# Patient Record
Sex: MALE | Race: WHITE | NOT HISPANIC OR LATINO | Employment: FULL TIME | URBAN - METROPOLITAN AREA
[De-identification: names, ages, dates, MRNs, and addresses within clinical notes are randomized per-mention and may not be internally consistent; named-entity substitution may affect disease eponyms.]

---

## 2017-07-07 ENCOUNTER — HOSPITAL ENCOUNTER (OUTPATIENT)
Dept: RADIOLOGY | Facility: MEDICAL CENTER | Age: 37
End: 2017-07-07
Attending: FAMILY MEDICINE
Payer: COMMERCIAL

## 2017-07-07 DIAGNOSIS — J01.90 ACUTE SINUSITIS, UNSPECIFIED: ICD-10-CM

## 2017-07-07 PROCEDURE — 71020 DX-CHEST-2 VIEWS: CPT

## 2017-07-08 ENCOUNTER — HOSPITAL ENCOUNTER (OUTPATIENT)
Dept: LAB | Facility: MEDICAL CENTER | Age: 37
End: 2017-07-08
Attending: FAMILY MEDICINE
Payer: COMMERCIAL

## 2017-07-08 LAB
25(OH)D3 SERPL-MCNC: 32 NG/ML (ref 30–100)
ALBUMIN SERPL BCP-MCNC: 4.3 G/DL (ref 3.2–4.9)
ALBUMIN/GLOB SERPL: 2 G/DL
ALP SERPL-CCNC: 47 U/L (ref 30–99)
ALT SERPL-CCNC: 170 U/L (ref 2–50)
ANION GAP SERPL CALC-SCNC: 8 MMOL/L (ref 0–11.9)
AST SERPL-CCNC: 94 U/L (ref 12–45)
BASOPHILS # BLD AUTO: 1.4 % (ref 0–1.8)
BASOPHILS # BLD: 0.05 K/UL (ref 0–0.12)
BILIRUB SERPL-MCNC: 1 MG/DL (ref 0.1–1.5)
BUN SERPL-MCNC: 17 MG/DL (ref 8–22)
CALCIUM SERPL-MCNC: 9.4 MG/DL (ref 8.5–10.5)
CHLORIDE SERPL-SCNC: 106 MMOL/L (ref 96–112)
CK SERPL-CCNC: 137 U/L (ref 0–154)
CO2 SERPL-SCNC: 28 MMOL/L (ref 20–33)
CREAT SERPL-MCNC: 1.16 MG/DL (ref 0.5–1.4)
CRP SERPL HS-MCNC: 0.04 MG/DL (ref 0–0.75)
EOSINOPHIL # BLD AUTO: 0.07 K/UL (ref 0–0.51)
EOSINOPHIL NFR BLD: 1.9 % (ref 0–6.9)
ERYTHROCYTE [DISTWIDTH] IN BLOOD BY AUTOMATED COUNT: 38.7 FL (ref 35.9–50)
ERYTHROCYTE [SEDIMENTATION RATE] IN BLOOD BY WESTERGREN METHOD: 6 MM/HOUR (ref 0–15)
GFR SERPL CREATININE-BSD FRML MDRD: >60 ML/MIN/1.73 M 2
GLOBULIN SER CALC-MCNC: 2.2 G/DL (ref 1.9–3.5)
GLUCOSE SERPL-MCNC: 82 MG/DL (ref 65–99)
HCT VFR BLD AUTO: 40.1 % (ref 42–52)
HGB BLD-MCNC: 14.2 G/DL (ref 14–18)
IMM GRANULOCYTES # BLD AUTO: 0.01 K/UL (ref 0–0.11)
IMM GRANULOCYTES NFR BLD AUTO: 0.3 % (ref 0–0.9)
IRON SATN MFR SERPL: 33 % (ref 15–55)
IRON SERPL-MCNC: 103 UG/DL (ref 50–180)
LYMPHOCYTES # BLD AUTO: 1.64 K/UL (ref 1–4.8)
LYMPHOCYTES NFR BLD: 44.9 % (ref 22–41)
MCH RBC QN AUTO: 30.8 PG (ref 27–33)
MCHC RBC AUTO-ENTMCNC: 35.4 G/DL (ref 33.7–35.3)
MCV RBC AUTO: 87 FL (ref 81.4–97.8)
MONOCYTES # BLD AUTO: 0.3 K/UL (ref 0–0.85)
MONOCYTES NFR BLD AUTO: 8.2 % (ref 0–13.4)
NEUTROPHILS # BLD AUTO: 1.58 K/UL (ref 1.82–7.42)
NEUTROPHILS NFR BLD: 43.3 % (ref 44–72)
NRBC # BLD AUTO: 0 K/UL
NRBC BLD AUTO-RTO: 0 /100 WBC
PLATELET # BLD AUTO: 160 K/UL (ref 164–446)
PMV BLD AUTO: 10.9 FL (ref 9–12.9)
POTASSIUM SERPL-SCNC: 4.4 MMOL/L (ref 3.6–5.5)
PROT SERPL-MCNC: 6.5 G/DL (ref 6–8.2)
RBC # BLD AUTO: 4.61 M/UL (ref 4.7–6.1)
RHEUMATOID FACT SER IA-ACNC: <10 IU/ML (ref 0–14)
SODIUM SERPL-SCNC: 142 MMOL/L (ref 135–145)
TIBC SERPL-MCNC: 311 UG/DL (ref 250–450)
VIT B12 SERPL-MCNC: 562 PG/ML (ref 211–911)
WBC # BLD AUTO: 3.7 K/UL (ref 4.8–10.8)

## 2017-07-08 PROCEDURE — 86618 LYME DISEASE ANTIBODY: CPT

## 2017-07-08 PROCEDURE — 86200 CCP ANTIBODY: CPT

## 2017-07-08 PROCEDURE — 82550 ASSAY OF CK (CPK): CPT

## 2017-07-08 PROCEDURE — 86664 EPSTEIN-BARR NUCLEAR ANTIGEN: CPT

## 2017-07-08 PROCEDURE — 86038 ANTINUCLEAR ANTIBODIES: CPT

## 2017-07-08 PROCEDURE — 82607 VITAMIN B-12: CPT

## 2017-07-08 PROCEDURE — 86663 EPSTEIN-BARR ANTIBODY: CPT

## 2017-07-08 PROCEDURE — 86812 HLA TYPING A B OR C: CPT

## 2017-07-08 PROCEDURE — 84165 PROTEIN E-PHORESIS SERUM: CPT

## 2017-07-08 PROCEDURE — 82306 VITAMIN D 25 HYDROXY: CPT

## 2017-07-08 PROCEDURE — 83550 IRON BINDING TEST: CPT

## 2017-07-08 PROCEDURE — 82784 ASSAY IGA/IGD/IGG/IGM EACH: CPT | Mod: 91

## 2017-07-08 PROCEDURE — 85652 RBC SED RATE AUTOMATED: CPT

## 2017-07-08 PROCEDURE — 83540 ASSAY OF IRON: CPT

## 2017-07-08 PROCEDURE — 86665 EPSTEIN-BARR CAPSID VCA: CPT

## 2017-07-08 PROCEDURE — 80053 COMPREHEN METABOLIC PANEL: CPT

## 2017-07-08 PROCEDURE — 86431 RHEUMATOID FACTOR QUANT: CPT

## 2017-07-08 PROCEDURE — 86334 IMMUNOFIX E-PHORESIS SERUM: CPT

## 2017-07-08 PROCEDURE — 36415 COLL VENOUS BLD VENIPUNCTURE: CPT

## 2017-07-08 PROCEDURE — 84160 ASSAY OF PROTEIN ANY SOURCE: CPT

## 2017-07-08 PROCEDURE — 82784 ASSAY IGA/IGD/IGG/IGM EACH: CPT

## 2017-07-08 PROCEDURE — 85025 COMPLETE CBC W/AUTO DIFF WBC: CPT

## 2017-07-08 PROCEDURE — 86140 C-REACTIVE PROTEIN: CPT

## 2017-07-10 LAB
B BURGDOR AB SER IA-ACNC: 0.17 LIV (ref 0–1.2)
CCP IGG SERPL-ACNC: 5 UNITS (ref 0–19)
HLA-B27 QL FC: NORMAL
NUCLEAR IGG SER QL IA: NORMAL

## 2017-07-11 LAB
EBV EA-D IGG SER-ACNC: <5 U/ML (ref 0–10.9)
EBV NA IGG SER IA-ACNC: 120 U/ML (ref 0–21.9)
EBV VCA IGG SER IA-ACNC: 166 U/ML (ref 0–21.9)
EBV VCA IGM SER IA-ACNC: <10 U/ML (ref 0–43.9)

## 2017-07-12 LAB
ALBUMIN SERPL-MCNC: 4.66 G/DL (ref 3.75–5.01)
ALPHA1 GLOB SERPL ELPH-MCNC: 0.22 G/DL (ref 0.19–0.46)
ALPHA2 GLOB SERPL ELPH-MCNC: 0.51 G/DL (ref 0.48–1.05)
B-GLOBULIN SERPL ELPH-MCNC: 0.61 G/DL (ref 0.48–1.1)
GAMMA GLOB SERPL ELPH-MCNC: 0.8 G/DL (ref 0.62–1.51)
IGA SERPL-MCNC: 89 MG/DL (ref 68–408)
IGG SERPL-MCNC: 915 MG/DL (ref 768–1632)
IGM SERPL-MCNC: 50 MG/DL (ref 35–263)
INTERPRETATION SERPL IFE-IMP: NORMAL
INTERPRETATION SERPL IFE-IMP: NORMAL
PATHOLOGY STUDY: NORMAL
PROT SERPL-MCNC: 6.8 G/DL (ref 6–8.3)

## 2017-07-14 ENCOUNTER — HOSPITAL ENCOUNTER (OUTPATIENT)
Dept: RADIOLOGY | Facility: MEDICAL CENTER | Age: 37
End: 2017-07-14
Attending: FAMILY MEDICINE
Payer: COMMERCIAL

## 2017-07-14 DIAGNOSIS — J01.90 ACUTE SINUSITIS, UNSPECIFIED: ICD-10-CM

## 2017-07-14 PROCEDURE — 70486 CT MAXILLOFACIAL W/O DYE: CPT

## 2017-07-19 ENCOUNTER — HOSPITAL ENCOUNTER (OUTPATIENT)
Facility: MEDICAL CENTER | Age: 37
End: 2017-07-19
Attending: OPHTHALMOLOGY
Payer: COMMERCIAL

## 2017-07-19 LAB
ALBUMIN SERPL BCP-MCNC: 4.4 G/DL (ref 3.2–4.9)
ALBUMIN/GLOB SERPL: 2 G/DL
ALP SERPL-CCNC: 41 U/L (ref 30–99)
ALT SERPL-CCNC: 166 U/L (ref 2–50)
AMYLASE SERPL-CCNC: 30 U/L (ref 20–103)
ANION GAP SERPL CALC-SCNC: 7 MMOL/L (ref 0–11.9)
AST SERPL-CCNC: 91 U/L (ref 12–45)
BASOPHILS # BLD AUTO: 1.5 % (ref 0–1.8)
BASOPHILS # BLD: 0.06 K/UL (ref 0–0.12)
BILIRUB CONJ SERPL-MCNC: 0.2 MG/DL (ref 0.1–0.5)
BILIRUB INDIRECT SERPL-MCNC: 1.3 MG/DL (ref 0–1)
BILIRUB SERPL-MCNC: 1.5 MG/DL (ref 0.1–1.5)
BUN SERPL-MCNC: 14 MG/DL (ref 8–22)
CALCIUM SERPL-MCNC: 9.9 MG/DL (ref 8.5–10.5)
CHLORIDE SERPL-SCNC: 104 MMOL/L (ref 96–112)
CO2 SERPL-SCNC: 28 MMOL/L (ref 20–33)
CREAT SERPL-MCNC: 1.16 MG/DL (ref 0.5–1.4)
EOSINOPHIL # BLD AUTO: 0.1 K/UL (ref 0–0.51)
EOSINOPHIL NFR BLD: 2.6 % (ref 0–6.9)
ERYTHROCYTE [DISTWIDTH] IN BLOOD BY AUTOMATED COUNT: 38.7 FL (ref 35.9–50)
GFR SERPL CREATININE-BSD FRML MDRD: >60 ML/MIN/1.73 M 2
GLOBULIN SER CALC-MCNC: 2.2 G/DL (ref 1.9–3.5)
GLUCOSE SERPL-MCNC: 90 MG/DL (ref 65–99)
HAV IGM SERPL QL IA: NEGATIVE
HBV CORE IGM SER QL: NEGATIVE
HBV SURFACE AG SER QL: NEGATIVE
HCT VFR BLD AUTO: 39.5 % (ref 42–52)
HCV AB SER QL: NEGATIVE
HGB BLD-MCNC: 13.6 G/DL (ref 14–18)
IMM GRANULOCYTES # BLD AUTO: 0.01 K/UL (ref 0–0.11)
IMM GRANULOCYTES NFR BLD AUTO: 0.3 % (ref 0–0.9)
LIPASE SERPL-CCNC: 15 U/L (ref 11–82)
LYMPHOCYTES # BLD AUTO: 1.76 K/UL (ref 1–4.8)
LYMPHOCYTES NFR BLD: 44.9 % (ref 22–41)
MCH RBC QN AUTO: 30.2 PG (ref 27–33)
MCHC RBC AUTO-ENTMCNC: 34.4 G/DL (ref 33.7–35.3)
MCV RBC AUTO: 87.8 FL (ref 81.4–97.8)
MONOCYTES # BLD AUTO: 0.23 K/UL (ref 0–0.85)
MONOCYTES NFR BLD AUTO: 5.9 % (ref 0–13.4)
NEUTROPHILS # BLD AUTO: 1.76 K/UL (ref 1.82–7.42)
NEUTROPHILS NFR BLD: 44.8 % (ref 44–72)
NRBC # BLD AUTO: 0 K/UL
NRBC BLD AUTO-RTO: 0 /100 WBC
PLATELET # BLD AUTO: 175 K/UL (ref 164–446)
PMV BLD AUTO: 10.5 FL (ref 9–12.9)
POTASSIUM SERPL-SCNC: 3.9 MMOL/L (ref 3.6–5.5)
PROT SERPL-MCNC: 6.6 G/DL (ref 6–8.2)
RBC # BLD AUTO: 4.5 M/UL (ref 4.7–6.1)
SODIUM SERPL-SCNC: 139 MMOL/L (ref 135–145)
WBC # BLD AUTO: 3.9 K/UL (ref 4.8–10.8)

## 2017-07-19 PROCEDURE — 83690 ASSAY OF LIPASE: CPT

## 2017-07-19 PROCEDURE — 82248 BILIRUBIN DIRECT: CPT

## 2017-07-19 PROCEDURE — 82150 ASSAY OF AMYLASE: CPT

## 2017-07-19 PROCEDURE — 85025 COMPLETE CBC W/AUTO DIFF WBC: CPT

## 2017-07-19 PROCEDURE — 80074 ACUTE HEPATITIS PANEL: CPT

## 2017-07-19 PROCEDURE — 80053 COMPREHEN METABOLIC PANEL: CPT

## 2017-07-29 ENCOUNTER — HOSPITAL ENCOUNTER (OUTPATIENT)
Dept: RADIOLOGY | Facility: MEDICAL CENTER | Age: 37
End: 2017-07-29
Attending: FAMILY MEDICINE
Payer: COMMERCIAL

## 2017-07-29 DIAGNOSIS — R74.8 OTHER NONSPECIFIC ABNORMAL SERUM ENZYME LEVELS: ICD-10-CM

## 2017-07-29 PROCEDURE — 71260 CT THORAX DX C+: CPT

## 2017-07-29 PROCEDURE — 700117 HCHG RX CONTRAST REV CODE 255: Performed by: FAMILY MEDICINE

## 2017-07-29 RX ADMIN — IOHEXOL 100 ML: 350 INJECTION, SOLUTION INTRAVENOUS at 09:14

## 2017-08-05 ENCOUNTER — HOSPITAL ENCOUNTER (OUTPATIENT)
Dept: LAB | Facility: MEDICAL CENTER | Age: 37
End: 2017-08-05
Attending: FAMILY MEDICINE
Payer: COMMERCIAL

## 2017-08-05 LAB
ALBUMIN SERPL BCP-MCNC: 4 G/DL (ref 3.2–4.9)
ALP SERPL-CCNC: 40 U/L (ref 30–99)
ALT SERPL-CCNC: 165 U/L (ref 2–50)
AST SERPL-CCNC: 91 U/L (ref 12–45)
BILIRUB CONJ SERPL-MCNC: 0.2 MG/DL (ref 0.1–0.5)
BILIRUB INDIRECT SERPL-MCNC: 1 MG/DL (ref 0–1)
BILIRUB SERPL-MCNC: 1.2 MG/DL (ref 0.1–1.5)
ERYTHROCYTE [DISTWIDTH] IN BLOOD BY AUTOMATED COUNT: 42.9 FL (ref 35.9–50)
HCT VFR BLD AUTO: 36.1 % (ref 42–52)
HGB BLD-MCNC: 12.8 G/DL (ref 14–18)
HGB RETIC QN AUTO: 36.8 PG/CELL (ref 29–35)
IMM RETICS NFR: 8.9 % (ref 9.3–17.4)
MCH RBC QN AUTO: 31.2 PG (ref 27–33)
MCHC RBC AUTO-ENTMCNC: 35.5 G/DL (ref 33.7–35.3)
MCV RBC AUTO: 88 FL (ref 81.4–97.8)
PLATELET # BLD AUTO: 157 K/UL (ref 164–446)
PMV BLD AUTO: 10.8 FL (ref 9–12.9)
PROT SERPL-MCNC: 6 G/DL (ref 6–8.2)
RBC # BLD AUTO: 4.1 M/UL (ref 4.7–6.1)
RETICS # AUTO: 0.09 M/UL (ref 0.04–0.06)
RETICS/RBC NFR: 2.1 % (ref 0.8–2.1)
WBC # BLD AUTO: 3.5 K/UL (ref 4.8–10.8)

## 2017-08-05 PROCEDURE — 85046 RETICYTE/HGB CONCENTRATE: CPT

## 2017-08-05 PROCEDURE — 85027 COMPLETE CBC AUTOMATED: CPT

## 2017-08-05 PROCEDURE — 36415 COLL VENOUS BLD VENIPUNCTURE: CPT

## 2017-08-05 PROCEDURE — 80076 HEPATIC FUNCTION PANEL: CPT

## 2017-08-17 ENCOUNTER — HOSPITAL ENCOUNTER (OUTPATIENT)
Facility: MEDICAL CENTER | Age: 37
End: 2017-08-17
Attending: INTERNAL MEDICINE
Payer: COMMERCIAL

## 2017-08-17 ENCOUNTER — HOSPITAL ENCOUNTER (OUTPATIENT)
Dept: LAB | Facility: MEDICAL CENTER | Age: 37
End: 2017-08-17
Attending: INTERNAL MEDICINE
Payer: COMMERCIAL

## 2017-08-17 LAB
GFR SERPL CREATININE-BSD FRML MDRD: >60 ML/MIN/1.73 M 2
RHEUMATOID FACT SER IA-ACNC: <10 IU/ML (ref 0–14)

## 2017-08-17 PROCEDURE — 86157 COLD AGGLUTININ TITER: CPT

## 2017-08-17 PROCEDURE — 82550 ASSAY OF CK (CPK): CPT

## 2017-08-17 PROCEDURE — 86200 CCP ANTIBODY: CPT

## 2017-08-17 PROCEDURE — 86235 NUCLEAR ANTIGEN ANTIBODY: CPT | Mod: 91

## 2017-08-17 PROCEDURE — 86431 RHEUMATOID FACTOR QUANT: CPT

## 2017-08-17 PROCEDURE — 82784 ASSAY IGA/IGD/IGG/IGM EACH: CPT

## 2017-08-17 PROCEDURE — 82248 BILIRUBIN DIRECT: CPT

## 2017-08-17 PROCEDURE — 86038 ANTINUCLEAR ANTIBODIES: CPT | Mod: 91

## 2017-08-17 PROCEDURE — 83010 ASSAY OF HAPTOGLOBIN QUANT: CPT

## 2017-08-17 PROCEDURE — 80053 COMPREHEN METABOLIC PANEL: CPT

## 2017-08-17 PROCEDURE — 86225 DNA ANTIBODY NATIVE: CPT

## 2017-08-17 PROCEDURE — 83615 LACTATE (LD) (LDH) ENZYME: CPT

## 2017-08-17 PROCEDURE — 82784 ASSAY IGA/IGD/IGG/IGM EACH: CPT | Mod: 91

## 2017-08-17 PROCEDURE — 81003 URINALYSIS AUTO W/O SCOPE: CPT

## 2017-08-17 PROCEDURE — 86255 FLUORESCENT ANTIBODY SCREEN: CPT | Mod: 91

## 2017-08-17 PROCEDURE — 86235 NUCLEAR ANTIGEN ANTIBODY: CPT

## 2017-08-17 PROCEDURE — 36415 COLL VENOUS BLD VENIPUNCTURE: CPT

## 2017-08-17 PROCEDURE — 86038 ANTINUCLEAR ANTIBODIES: CPT

## 2017-08-17 PROCEDURE — 86880 COOMBS TEST DIRECT: CPT

## 2017-08-18 LAB
ALBUMIN SERPL BCP-MCNC: 4.1 G/DL (ref 3.2–4.9)
ALBUMIN/GLOB SERPL: 2.1 G/DL
ALP SERPL-CCNC: 50 U/L (ref 30–99)
ALT SERPL-CCNC: 238 U/L (ref 2–50)
ANION GAP SERPL CALC-SCNC: 7 MMOL/L (ref 0–11.9)
APPEARANCE UR: CLEAR
AST SERPL-CCNC: 121 U/L (ref 12–45)
BILIRUB CONJ SERPL-MCNC: 0.2 MG/DL (ref 0.1–0.5)
BILIRUB INDIRECT SERPL-MCNC: 1.4 MG/DL (ref 0–1)
BILIRUB SERPL-MCNC: 1.6 MG/DL (ref 0.1–1.5)
BILIRUB UR QL STRIP.AUTO: NEGATIVE
BUN SERPL-MCNC: 15 MG/DL (ref 8–22)
CALCIUM SERPL-MCNC: 9.5 MG/DL (ref 8.5–10.5)
CHLORIDE SERPL-SCNC: 106 MMOL/L (ref 96–112)
CK SERPL-CCNC: 146 U/L (ref 0–154)
CO2 SERPL-SCNC: 28 MMOL/L (ref 20–33)
COLOR UR: YELLOW
CREAT SERPL-MCNC: 1.04 MG/DL (ref 0.5–1.4)
CULTURE IF INDICATED INDCX: NO UA CULTURE
DAT C3D-SP REAG RBC QL: NORMAL
DAT IGG-SP REAG RBC QL: NORMAL
GLOBULIN SER CALC-MCNC: 2 G/DL (ref 1.9–3.5)
GLUCOSE SERPL-MCNC: 73 MG/DL (ref 65–99)
GLUCOSE UR STRIP.AUTO-MCNC: NEGATIVE MG/DL
KETONES UR STRIP.AUTO-MCNC: NEGATIVE MG/DL
LDH SERPL-CCNC: 237 U/L (ref 107–266)
LEUKOCYTE ESTERASE UR QL STRIP.AUTO: NEGATIVE
MICRO URNS: NORMAL
NITRITE UR QL STRIP.AUTO: NEGATIVE
PH UR STRIP.AUTO: 6 [PH]
POTASSIUM SERPL-SCNC: 4.2 MMOL/L (ref 3.6–5.5)
PROT SERPL-MCNC: 6.1 G/DL (ref 6–8.2)
PROT UR QL STRIP: NEGATIVE MG/DL
RBC UR QL AUTO: NEGATIVE
SODIUM SERPL-SCNC: 141 MMOL/L (ref 135–145)
SP GR UR STRIP.AUTO: 1.01
UROBILINOGEN UR STRIP.AUTO-MCNC: NORMAL MG/DL

## 2017-08-19 ENCOUNTER — HOSPITAL ENCOUNTER (OUTPATIENT)
Dept: LAB | Facility: MEDICAL CENTER | Age: 37
End: 2017-08-19
Attending: INTERNAL MEDICINE
Payer: COMMERCIAL

## 2017-08-19 LAB
ANCA IGG TITR SER IF: NORMAL {TITER}
CCP IGG SERPL-ACNC: 5 UNITS (ref 0–19)
FERRITIN SERPL-MCNC: 300.9 NG/ML (ref 22–322)
GGT SERPL-CCNC: 20 U/L (ref 7–51)
HAPTOGLOB SERPL-MCNC: <10 MG/DL (ref 30–200)
IGA SERPL-MCNC: 74 MG/DL (ref 68–408)
IGG SERPL-MCNC: 785 MG/DL (ref 768–1632)
IGM SERPL-MCNC: 45 MG/DL (ref 35–263)
MITOCHONDRIA M2 IGG SER-ACNC: 20.2 UNITS (ref 0–20)
NUCLEAR IGG SER QL IA: NORMAL
NUCLEAR IGG SER QL IA: NORMAL

## 2017-08-19 PROCEDURE — 87522 HEPATITIS C REVRS TRNSCRPJ: CPT

## 2017-08-19 PROCEDURE — 86789 WEST NILE VIRUS ANTIBODY: CPT

## 2017-08-19 PROCEDURE — 86694 HERPES SIMPLEX NES ANTBDY: CPT

## 2017-08-19 PROCEDURE — 86654 ENCEPHALTIS WEST EQNE ANTBDY: CPT | Mod: 91

## 2017-08-19 PROCEDURE — 82977 ASSAY OF GGT: CPT

## 2017-08-19 PROCEDURE — 86617 LYME DISEASE ANTIBODY: CPT

## 2017-08-19 PROCEDURE — 86790 VIRUS ANTIBODY NOS: CPT | Mod: 91

## 2017-08-19 PROCEDURE — 86653 ENCEPHALTIS ST LOUIS ANTBODY: CPT | Mod: 91

## 2017-08-19 PROCEDURE — 82728 ASSAY OF FERRITIN: CPT

## 2017-08-19 PROCEDURE — 86788 WEST NILE VIRUS AB IGM: CPT

## 2017-08-19 PROCEDURE — 86747 PARVOVIRUS ANTIBODY: CPT

## 2017-08-19 PROCEDURE — 82784 ASSAY IGA/IGD/IGG/IGM EACH: CPT

## 2017-08-19 PROCEDURE — 86652 ENCEPHALTIS EAST EQNE ANBDY: CPT

## 2017-08-19 PROCEDURE — 86376 MICROSOMAL ANTIBODY EACH: CPT

## 2017-08-19 PROCEDURE — 82390 ASSAY OF CERULOPLASMIN: CPT

## 2017-08-19 PROCEDURE — 83516 IMMUNOASSAY NONANTIBODY: CPT

## 2017-08-19 PROCEDURE — 82103 ALPHA-1-ANTITRYPSIN TOTAL: CPT

## 2017-08-19 PROCEDURE — 86645 CMV ANTIBODY IGM: CPT

## 2017-08-19 PROCEDURE — 86651 ENCEPHALITIS CALIFORN ANTBDY: CPT | Mod: 91

## 2017-08-19 PROCEDURE — 36415 COLL VENOUS BLD VENIPUNCTURE: CPT

## 2017-08-20 LAB — ENA SM IGG SER-ACNC: 0 AU/ML (ref 0–40)

## 2017-08-21 LAB
A1AT SERPL-MCNC: 106 MG/DL (ref 90–200)
B BURGDOR IGG SER QL IB: NEGATIVE
B19V IGG SER IA-ACNC: 6.99 IV
B19V IGM SER IA-ACNC: 0.24 IV
CMV IGM SERPL IA-ACNC: <8 AU/ML
HSV1+2 IGM SER IA-ACNC: 0.25 IV
IGA SERPL-MCNC: 73 MG/DL (ref 68–408)
LKM AB TITR SER IF: NORMAL {TITER}
TTG IGA SER IA-ACNC: 0 U/ML (ref 0–3)

## 2017-08-22 LAB
CA TITR SERPL AGGL: NORMAL {TITER}
CERULOPLASMIN SERPL-MCNC: 17 MG/DL (ref 17–54)
HCV RNA SERPL NAA+PROBE-ACNC: <15 IU/ML
HCV RNA SERPL NAA+PROBE-LOG IU: <1.2 LOG IU
HCV RNA SERPL QL NAA+PROBE: NOT DETECTED
HEV IGM SER QL: NEGATIVE
PATHOLOGY STUDY: NORMAL
SMA IGG SER-ACNC: 46 UNITS (ref 0–19)
SMOOTH MUSCLE IGG TITR SER: ABNORMAL {TITER}

## 2017-08-23 LAB
EEEV IGG TITR SER IF: NORMAL {TITER}
EEEV IGM TITR SER IF: NORMAL {TITER}
LACV IGG TITR SER IF: NORMAL {TITER}
LACV IGM TITR SER IF: NORMAL {TITER}
SLEV IGG TITR SER IF: NORMAL {TITER}
SLEV IGM TITR SER IF: NORMAL {TITER}
WEEV IGG TITR SER IF: NORMAL {TITER}
WEEV IGM TITR SER IF: NORMAL {TITER}
WNV IGG SER IA-ACNC: 0.06 IV
WNV IGM SER IA-ACNC: 0 IV

## 2017-08-30 ENCOUNTER — HOSPITAL ENCOUNTER (OUTPATIENT)
Dept: LAB | Facility: MEDICAL CENTER | Age: 37
End: 2017-08-30
Attending: INTERNAL MEDICINE
Payer: COMMERCIAL

## 2017-08-30 LAB
ALBUMIN SERPL BCP-MCNC: 3.9 G/DL (ref 3.2–4.9)
ALP SERPL-CCNC: 50 U/L (ref 30–99)
ALT SERPL-CCNC: 89 U/L (ref 2–50)
AST SERPL-CCNC: 57 U/L (ref 12–45)
BILIRUB CONJ SERPL-MCNC: 0.3 MG/DL (ref 0.1–0.5)
BILIRUB INDIRECT SERPL-MCNC: 1.5 MG/DL (ref 0–1)
BILIRUB SERPL-MCNC: 1.8 MG/DL (ref 0.1–1.5)
ERYTHROCYTE [DISTWIDTH] IN BLOOD BY AUTOMATED COUNT: 39.8 FL (ref 35.9–50)
FASTING STATUS PATIENT QL REPORTED: NORMAL
HCT VFR BLD AUTO: 36.8 % (ref 42–52)
HGB BLD-MCNC: 13.2 G/DL (ref 14–18)
INR PPP: 0.98 (ref 0.87–1.13)
MCH RBC QN AUTO: 31.3 PG (ref 27–33)
MCHC RBC AUTO-ENTMCNC: 35.9 G/DL (ref 33.7–35.3)
MCV RBC AUTO: 87.2 FL (ref 81.4–97.8)
PLATELET # BLD AUTO: 163 K/UL (ref 164–446)
PMV BLD AUTO: 10.8 FL (ref 9–12.9)
PROT SERPL-MCNC: 6.3 G/DL (ref 6–8.2)
PROTHROMBIN TIME: 13.3 SEC (ref 12–14.6)
RBC # BLD AUTO: 4.22 M/UL (ref 4.7–6.1)
TSH SERPL DL<=0.005 MIU/L-ACNC: 1.03 UIU/ML (ref 0.3–3.7)
WBC # BLD AUTO: 3 K/UL (ref 4.8–10.8)

## 2017-08-30 PROCEDURE — 84443 ASSAY THYROID STIM HORMONE: CPT

## 2017-08-30 PROCEDURE — 85610 PROTHROMBIN TIME: CPT

## 2017-08-30 PROCEDURE — 80076 HEPATIC FUNCTION PANEL: CPT

## 2017-08-30 PROCEDURE — 85027 COMPLETE CBC AUTOMATED: CPT

## 2017-08-30 PROCEDURE — 36415 COLL VENOUS BLD VENIPUNCTURE: CPT

## 2017-09-12 ENCOUNTER — HOSPITAL ENCOUNTER (EMERGENCY)
Facility: MEDICAL CENTER | Age: 37
End: 2017-09-13
Attending: EMERGENCY MEDICINE
Payer: COMMERCIAL

## 2017-09-12 ENCOUNTER — HOSPITAL ENCOUNTER (EMERGENCY)
Facility: MEDICAL CENTER | Age: 37
End: 2017-09-12
Payer: COMMERCIAL

## 2017-09-12 ENCOUNTER — APPOINTMENT (OUTPATIENT)
Dept: RADIOLOGY | Facility: MEDICAL CENTER | Age: 37
End: 2017-09-12
Attending: EMERGENCY MEDICINE
Payer: COMMERCIAL

## 2017-09-12 ENCOUNTER — HOSPITAL ENCOUNTER (OUTPATIENT)
Facility: MEDICAL CENTER | Age: 37
End: 2017-09-12
Attending: PSYCHIATRY & NEUROLOGY
Payer: COMMERCIAL

## 2017-09-12 ENCOUNTER — HOSPITAL ENCOUNTER (OUTPATIENT)
Facility: MEDICAL CENTER | Age: 37
End: 2017-09-12
Attending: INTERNAL MEDICINE
Payer: COMMERCIAL

## 2017-09-12 ENCOUNTER — HOSPITAL ENCOUNTER (OUTPATIENT)
Dept: LAB | Facility: MEDICAL CENTER | Age: 37
End: 2017-09-12
Attending: OPHTHALMOLOGY
Payer: COMMERCIAL

## 2017-09-12 DIAGNOSIS — H46.9 OPTIC NEUROPATHY: ICD-10-CM

## 2017-09-12 DIAGNOSIS — R93.0 MASS IN REGION OF SELLA TURCICA PRESENT ON MAGNETIC RESONANCE IMAGING: ICD-10-CM

## 2017-09-12 DIAGNOSIS — R51.9 ACUTE NONINTRACTABLE HEADACHE, UNSPECIFIED HEADACHE TYPE: ICD-10-CM

## 2017-09-12 LAB
GFR SERPL CREATININE-BSD FRML MDRD: >60 ML/MIN/1.73 M 2
HCYS SERPL-SCNC: 8.39 UMOL/L
TREPONEMA PALLIDUM IGG+IGM AB [PRESENCE] IN SERUM OR PLASMA BY IMMUNOASSAY: NON REACTIVE

## 2017-09-12 PROCEDURE — A9579 GAD-BASE MR CONTRAST NOS,1ML: HCPCS | Performed by: EMERGENCY MEDICINE

## 2017-09-12 PROCEDURE — 70543 MRI ORBT/FAC/NCK W/O &W/DYE: CPT

## 2017-09-12 PROCEDURE — 85549 MURAMIDASE: CPT

## 2017-09-12 PROCEDURE — 86480 TB TEST CELL IMMUN MEASURE: CPT

## 2017-09-12 PROCEDURE — 85730 THROMBOPLASTIN TIME PARTIAL: CPT | Mod: 91

## 2017-09-12 PROCEDURE — 82533 TOTAL CORTISOL: CPT

## 2017-09-12 PROCEDURE — 85613 RUSSELL VIPER VENOM DILUTED: CPT

## 2017-09-12 PROCEDURE — 99284 EMERGENCY DEPT VISIT MOD MDM: CPT

## 2017-09-12 PROCEDURE — 84443 ASSAY THYROID STIM HORMONE: CPT

## 2017-09-12 PROCEDURE — 700105 HCHG RX REV CODE 258: Performed by: EMERGENCY MEDICINE

## 2017-09-12 PROCEDURE — 84305 ASSAY OF SOMATOMEDIN: CPT

## 2017-09-12 PROCEDURE — 85610 PROTHROMBIN TIME: CPT

## 2017-09-12 PROCEDURE — 84146 ASSAY OF PROLACTIN: CPT

## 2017-09-12 PROCEDURE — 86146 BETA-2 GLYCOPROTEIN ANTIBODY: CPT | Mod: 91

## 2017-09-12 PROCEDURE — 700117 HCHG RX CONTRAST REV CODE 255: Performed by: EMERGENCY MEDICINE

## 2017-09-12 PROCEDURE — 86780 TREPONEMA PALLIDUM: CPT

## 2017-09-12 PROCEDURE — 36415 COLL VENOUS BLD VENIPUNCTURE: CPT

## 2017-09-12 PROCEDURE — 84403 ASSAY OF TOTAL TESTOSTERONE: CPT

## 2017-09-12 PROCEDURE — 82164 ANGIOTENSIN I ENZYME TEST: CPT

## 2017-09-12 PROCEDURE — 85025 COMPLETE CBC W/AUTO DIFF WBC: CPT

## 2017-09-12 PROCEDURE — 86147 CARDIOLIPIN ANTIBODY EA IG: CPT

## 2017-09-12 PROCEDURE — 83615 LACTATE (LD) (LDH) ENZYME: CPT

## 2017-09-12 PROCEDURE — 82955 ASSAY OF G6PD ENZYME: CPT

## 2017-09-12 PROCEDURE — 70553 MRI BRAIN STEM W/O & W/DYE: CPT

## 2017-09-12 PROCEDURE — 82595 ASSAY OF CRYOGLOBULIN: CPT

## 2017-09-12 PROCEDURE — 86356 MONONUCLEAR CELL ANTIGEN: CPT | Mod: 91

## 2017-09-12 PROCEDURE — 83090 ASSAY OF HOMOCYSTEINE: CPT

## 2017-09-12 PROCEDURE — 85732 THROMBOPLASTIN TIME PARTIAL: CPT

## 2017-09-12 PROCEDURE — 85046 RETICYTE/HGB CONCENTRATE: CPT

## 2017-09-12 PROCEDURE — 83010 ASSAY OF HAPTOGLOBIN QUANT: CPT

## 2017-09-12 PROCEDURE — 86880 COOMBS TEST DIRECT: CPT | Mod: 91

## 2017-09-12 PROCEDURE — 80053 COMPREHEN METABOLIC PANEL: CPT

## 2017-09-12 PROCEDURE — 86157 COLD AGGLUTININ TITER: CPT

## 2017-09-12 RX ORDER — SODIUM CHLORIDE 9 MG/ML
INJECTION, SOLUTION INTRAVENOUS ONCE
Status: COMPLETED | OUTPATIENT
Start: 2017-09-12 | End: 2017-09-13

## 2017-09-12 RX ORDER — IBUPROFEN 400 MG/1
200 TABLET ORAL EVERY 6 HOURS PRN
COMMUNITY

## 2017-09-12 RX ADMIN — GADODIAMIDE 20 ML: 287 INJECTION INTRAVENOUS at 23:42

## 2017-09-12 RX ADMIN — SODIUM CHLORIDE: 9 INJECTION, SOLUTION INTRAVENOUS at 22:07

## 2017-09-12 ASSESSMENT — PAIN SCALES - GENERAL: PAINLEVEL_OUTOF10: 0

## 2017-09-13 VITALS
DIASTOLIC BLOOD PRESSURE: 79 MMHG | HEART RATE: 88 BPM | BODY MASS INDEX: 25.99 KG/M2 | TEMPERATURE: 98.5 F | WEIGHT: 171.52 LBS | OXYGEN SATURATION: 100 % | SYSTOLIC BLOOD PRESSURE: 120 MMHG | RESPIRATION RATE: 16 BRPM | HEIGHT: 68 IN

## 2017-09-13 LAB
B2 GLYCOPROT1 IGA SER-ACNC: 0 SAU (ref 0–20)
B2 GLYCOPROT1 IGG SERPL IA-ACNC: 0 SGU (ref 0–20)
B2 GLYCOPROT1 IGM SERPL IA-ACNC: 1 SMU (ref 0–20)
CARDIOLIPIN IGA SER IA-ACNC: 0 APL (ref 0–11)
CARDIOLIPIN IGG SER IA-ACNC: 1 GPL (ref 0–14)
CARDIOLIPIN IGM SER IA-ACNC: 0 MPL (ref 0–12)
G6PD RBC-CCNC: 12.4 U/G HB (ref 9.9–16.6)
HAPTOGLOB SERPL-MCNC: <10 MG/DL (ref 30–200)
TEST NAME 95000: NORMAL

## 2017-09-13 NOTE — ED NOTES
Pt here for optical mri  MRI staff not here to completed procedure.  Explained to pt no mri available here tonight    Pt signed out prior to being seen

## 2017-09-13 NOTE — ED NOTES
"Chief Complaint   Patient presents with   • Headache     \"need MRI\"     Pt ambulatory to triage with steady gait. Family at bedside. Pt is AOx4, denies CP/SOB. Pt reports he has seen his primary MD and has an order to receive an MRI. /79   Pulse 77   Temp 36.9 °C (98.5 °F)   Resp 16   Ht 1.727 m (5' 8\")   Wt 77.8 kg (171 lb 8.3 oz)   SpO2 100%   BMI 26.08 kg/m²   Pt to room yellow 54, escorted by ED Tech.   "

## 2017-09-13 NOTE — ED NOTES
Fluids infusing.  Patient updated on plan of care.  Patient next in line for MRI.  Family at bedside.

## 2017-09-13 NOTE — DISCHARGE INSTRUCTIONS
Call Dr Rodas tomorrow for appointment in his clinic Thursday. Return for vomiting, worsening pain or worsening symptoms

## 2017-09-13 NOTE — ED NOTES
Patient dc'd to home w/ significant other. Patient alert and oriented x 4. Patient verbalizes understanding of discharge instructions and follow up care w/ neurosurgery. Patient denies questions upon dc. Patient ambulatory w/ steady gait.

## 2017-09-13 NOTE — ED PROVIDER NOTES
"ED Provider Note    Scribed for Tila Escobar M.D. by Ibeth Mccarty. 9/12/2017  8:48 PM    Primary care provider: Stefano PARIS M.D.  Means of arrival: Walk-In  History obtained from: Patient  History limited by: None    CHIEF COMPLAINT  Chief Complaint   Patient presents with   • Headache     \"need MRI\"       HPI  Tyrell Castro is a 37 y.o. male who presents to the Emergency Department complaining of an intermittent headache located to the occipital area of his head and radiating to the left side of his forehead and behind his left eye. Patient reports elevated liver enzymes, myalgia and vision changes for three months. He was seen by Dr. Benitez (Neuro ophthalmology) who is concerned for bilateral optic nerve swelling and would like an MRI. Denies neurological deficits.      REVIEW OF SYSTEMS  HEENT:  No ear pain, congestion, or sore throat   EYES: Positive for vision changes. no discharge, redness.  Neuro: Positive for headache. no weakness, numbness, aphasia.  Endocrine: no fevers, sweating, or weight loss   SKIN: no rash, erythema, or contusions       PAST MEDICAL HISTORY    No past medical history on file.    SURGICAL HISTORY  patient denies any surgical history    SOCIAL HISTORY  Social History   Substance Use Topics   • Smoking status: Never Smoker   • Smokeless tobacco: Never Used   • Alcohol use No      History   Drug Use No       FAMILY HISTORY  History reviewed. No pertinent family history.    CURRENT MEDICATIONS  Home Medications     Reviewed by Nadeem Hearn R.N. (Registered Nurse) on 09/12/17 at 2042  Med List Status: Complete   Medication Last Dose Status   ibuprofen (MOTRIN) 400 MG Tab 9/12/2017 Active                ALLERGIES  No Known Allergies    PHYSICAL EXAM  VITAL SIGNS: /79   Pulse 77   Temp 36.9 °C (98.5 °F)   Resp 16   Ht 1.727 m (5' 8\")   Wt 77.8 kg (171 lb 8.3 oz)   SpO2 100%   BMI 26.08 kg/m²     Constitutional: Well developed, Well nourished, No acute " distress, Non-toxic appearance.   HEENT: Normocephalic, Atraumatic,  external ears normal, pharynx pink,  Mucous  Membranes moist, No rhinorrhea or mucosal edema  Eyes: PERRL, EOMI, Conjunctiva normal, No discharge.   Neck: Normal range of motion, No tenderness, Supple, No stridor.   Lymphatic: No lymphadenopathy    Cardiovascular: Regular Rate and Rhythm, No murmurs,  rubs, or gallops.   Thorax & Lungs: Lungs clear to auscultation bilaterally, No respiratory distress, No wheezes, rhales or rhonchi, No chest wall tenderness.   Abdomen: Bowel sounds normal, Soft, non tender, non distended,  No pulsatile masses., no rebound guarding or peritoneal signs.   Skin: Warm, Dry, No erythema, No rash,   Extremities: Equal, intact distal pulses, No cyanosis, No tenderness.   Musculoskeletal: Good range of motion in all major joints. No tenderness to palpation or major deformities noted.   Neurologic: Alert & awake, no focal deficits  Psychiatric: Affect normal    DIAGNOSTIC STUDIES / PROCEDURES    RADIOLOGY  MR-BRAIN-WITH & W/O    (Results Pending)   MR-ORBITS,FACE,NECK-WITH&W/O & SEQUENCES    (Results Pending)     The radiologist's interpretation of all radiological studies have been reviewed by me.    12:22 AM The radiologist called me with the preliminary reading of the MRI, which was positive for a  2.2 by 1.7 by 1.5 centimeter partially cystic enhancing mass in the sella turcica.     COURSE & MEDICAL DECISION MAKING  Nursing notes, VS, PMSFHx reviewed in chart.     8:48 PM - Patient seen and examined at bedside. Patient will be treated with IV fluids for dehydration. Ordered MR-brain, MR-orbits, face, neck to evaluate his symptoms. Differentials include but are not limited to: intracranial hypertension, ICH, mass, migraine    12:23 AM  I spoke with Dr. Rodas. Neurosurgery, who wants to see him in his clinic on Thursday. The patient will be discharged home in stable condition and is to follow-up with neurosurgery as well as  neuro ophthalmology. He understands his diagnosis and the need for follow-up.    The patient will return for new or worsening symptoms and is stable at the time of discharge.    The patient is referred to a primary physician for blood pressure management, diabetic screening, and for all other preventative health concerns.    DISPOSITION:  Patient will be discharged home in stable condition.    FOLLOW UP:  James Rodas M.D.  [id number]  [address] [address]  [phone]    Call in 1 day  to establish care, for recheck      OUTPATIENT MEDICATIONS:  New Prescriptions    No medications on file         FINAL IMPRESSION  1. Optic neuropathy    2. Acute nonintractable headache, unspecified headache type    3. Mass in region of sella turcica present on magnetic resonance imaging          Ibeth GONSALES (Dawna), am scribing for, and in the presence of, Tila Escobar M.D..    Electronically signed by: Ibeth Mccarty (Dawna), 9/12/2017    Tila GONSALES M.D. personally performed the services described in this documentation, as scribed by Ibeth Mccarty in my presence, and it is both accurate and complete.    The note accurately reflects work and decisions made by me.  Tila Escobar  9/13/2017  12:23 AM

## 2017-09-14 ENCOUNTER — HOSPITAL ENCOUNTER (OUTPATIENT)
Facility: MEDICAL CENTER | Age: 37
End: 2017-09-14
Attending: HOSPITALIST | Admitting: HOSPITALIST
Payer: COMMERCIAL

## 2017-09-14 ENCOUNTER — RESOLUTE PROFESSIONAL BILLING HOSPITAL PROF FEE (OUTPATIENT)
Dept: HOSPITALIST | Facility: MEDICAL CENTER | Age: 37
End: 2017-09-14
Payer: COMMERCIAL

## 2017-09-14 VITALS
WEIGHT: 167.55 LBS | RESPIRATION RATE: 16 BRPM | OXYGEN SATURATION: 97 % | HEART RATE: 75 BPM | TEMPERATURE: 98.4 F | HEIGHT: 68 IN | BODY MASS INDEX: 25.39 KG/M2

## 2017-09-14 PROBLEM — D64.9 ANEMIA, UNSPECIFIED: Status: ACTIVE | Noted: 2017-09-14

## 2017-09-14 LAB
ACE SERPL-CCNC: 131 U/L (ref 9–67)
ALBUMIN SERPL BCP-MCNC: 4.4 G/DL (ref 3.2–4.9)
ALBUMIN/GLOB SERPL: 1.8 G/DL
ALP SERPL-CCNC: 50 U/L (ref 30–99)
ALT SERPL-CCNC: 81 U/L (ref 2–50)
ANION GAP SERPL CALC-SCNC: 7 MMOL/L (ref 0–11.9)
APTT 1H NP PPP: 32 SEC (ref 24.7–36)
APTT 1H P INC POOL PPP: 30.5 SEC (ref 24.7–36)
APTT 1H P INC PPP: 37.1 SEC (ref 24.7–36)
APTT IMM NP PPP: 34.4 SEC (ref 24.7–36)
APTT P HEP NEUT PPP: 37.9 SEC (ref 24.7–36)
APTT POOL PPP: 33.4 SEC (ref 24.7–36)
APTT PPP: 37.6 SEC (ref 24.7–36)
APTT PPP: 37.6 SEC (ref 24.7–36)
AST SERPL-CCNC: 58 U/L (ref 12–45)
BASOPHILS # BLD AUTO: 1.1 % (ref 0–1.8)
BASOPHILS # BLD AUTO: 1.4 % (ref 0–1.8)
BASOPHILS # BLD: 0.04 K/UL (ref 0–0.12)
BASOPHILS # BLD: 0.05 K/UL (ref 0–0.12)
BILIRUB SERPL-MCNC: 1.5 MG/DL (ref 0.1–1.5)
BUN SERPL-MCNC: 13 MG/DL (ref 8–22)
CA TITR SERPL AGGL: NORMAL {TITER}
CALCIUM SERPL-MCNC: 9.7 MG/DL (ref 8.5–10.5)
CHLORIDE SERPL-SCNC: 106 MMOL/L (ref 96–112)
CO2 SERPL-SCNC: 26 MMOL/L (ref 20–33)
CORTIS SERPL-MCNC: 3.3 UG/DL (ref 0–23)
CREAT SERPL-MCNC: 0.98 MG/DL (ref 0.5–1.4)
DAT C3D-SP REAG RBC QL: NORMAL
DAT IGG-SP REAG RBC QL: NORMAL
EOSINOPHIL # BLD AUTO: 0.06 K/UL (ref 0–0.51)
EOSINOPHIL # BLD AUTO: 0.07 K/UL (ref 0–0.51)
EOSINOPHIL NFR BLD: 1.7 % (ref 0–6.9)
EOSINOPHIL NFR BLD: 1.9 % (ref 0–6.9)
ERYTHROCYTE [DISTWIDTH] IN BLOOD BY AUTOMATED COUNT: 38.7 FL (ref 35.9–50)
ERYTHROCYTE [DISTWIDTH] IN BLOOD BY AUTOMATED COUNT: 39.5 FL (ref 35.9–50)
GLOBULIN SER CALC-MCNC: 2.4 G/DL (ref 1.9–3.5)
GLUCOSE SERPL-MCNC: 87 MG/DL (ref 65–99)
HCT VFR BLD AUTO: 36.3 % (ref 42–52)
HCT VFR BLD AUTO: 39.1 % (ref 42–52)
HGB BLD-MCNC: 13.1 G/DL (ref 14–18)
HGB BLD-MCNC: 14.4 G/DL (ref 14–18)
HGB RETIC QN AUTO: 36.2 PG/CELL (ref 29–35)
HGB RETIC QN AUTO: 36.7 PG/CELL (ref 29–35)
IMM GRANULOCYTES # BLD AUTO: 0 K/UL (ref 0–0.11)
IMM GRANULOCYTES # BLD AUTO: 0.01 K/UL (ref 0–0.11)
IMM GRANULOCYTES NFR BLD AUTO: 0 % (ref 0–0.9)
IMM GRANULOCYTES NFR BLD AUTO: 0.3 % (ref 0–0.9)
IMM RETICS NFR: 10.1 % (ref 9.3–17.4)
IMM RETICS NFR: 11.6 % (ref 9.3–17.4)
INR PPP: 0.94 (ref 0.87–1.13)
LA PPP-IMP: ABNORMAL
LDH SERPL-CCNC: 215 U/L (ref 107–266)
LYMPHOCYTES # BLD AUTO: 1.65 K/UL (ref 1–4.8)
LYMPHOCYTES # BLD AUTO: 1.9 K/UL (ref 1–4.8)
LYMPHOCYTES NFR BLD: 45.3 % (ref 22–41)
LYMPHOCYTES NFR BLD: 52.8 % (ref 22–41)
M TB TUBERC IFN-G BLD QL: POSITIVE
M TB TUBERC IFN-G/MITOGEN IGNF BLD: 3.1
M TB TUBERC IGNF/MITOGEN IGNF CONTROL: 52.09 [IU]/ML
MCH RBC QN AUTO: 31.3 PG (ref 27–33)
MCH RBC QN AUTO: 31.6 PG (ref 27–33)
MCHC RBC AUTO-ENTMCNC: 36.1 G/DL (ref 33.7–35.3)
MCHC RBC AUTO-ENTMCNC: 36.8 G/DL (ref 33.7–35.3)
MCV RBC AUTO: 85.7 FL (ref 81.4–97.8)
MCV RBC AUTO: 86.6 FL (ref 81.4–97.8)
MITOGEN IGNF BCKGRD COR BLD-ACNC: 0.02 [IU]/ML
MONOCYTES # BLD AUTO: 0.27 K/UL (ref 0–0.85)
MONOCYTES # BLD AUTO: 0.3 K/UL (ref 0–0.85)
MONOCYTES NFR BLD AUTO: 7.5 % (ref 0–13.4)
MONOCYTES NFR BLD AUTO: 8.2 % (ref 0–13.4)
NEUTROPHILS # BLD AUTO: 1.32 K/UL (ref 1.82–7.42)
NEUTROPHILS # BLD AUTO: 1.57 K/UL (ref 1.82–7.42)
NEUTROPHILS NFR BLD: 36.6 % (ref 44–72)
NEUTROPHILS NFR BLD: 43.2 % (ref 44–72)
NRBC # BLD AUTO: 0 K/UL
NRBC # BLD AUTO: 0 K/UL
NRBC BLD AUTO-RTO: 0 /100 WBC
NRBC BLD AUTO-RTO: 0 /100 WBC
PLATELET # BLD AUTO: 137 K/UL (ref 164–446)
PLATELET # BLD AUTO: 184 K/UL (ref 164–446)
PMV BLD AUTO: 10.1 FL (ref 9–12.9)
PMV BLD AUTO: 10.8 FL (ref 9–12.9)
POTASSIUM SERPL-SCNC: 3.7 MMOL/L (ref 3.6–5.5)
PROLACTIN SERPL-MCNC: >2000 NG/ML (ref 2.1–17.7)
PROT SERPL-MCNC: 6.8 G/DL (ref 6–8.2)
PROTHROMBIN TIME: 12.9 SEC (ref 12–14.6)
RBC # BLD AUTO: 4.19 M/UL (ref 4.7–6.1)
RBC # BLD AUTO: 4.56 M/UL (ref 4.7–6.1)
RETICS # AUTO: 0.09 M/UL (ref 0.04–0.06)
RETICS # AUTO: 0.1 M/UL (ref 0.04–0.06)
RETICS/RBC NFR: 2.2 % (ref 0.8–2.1)
RETICS/RBC NFR: 2.2 % (ref 0.8–2.1)
SCREEN DRVVT: 38.2 SEC (ref 28–48)
SODIUM SERPL-SCNC: 139 MMOL/L (ref 135–145)
TESTOST SERPL-MCNC: 134 NG/DL (ref 175–781)
TSH SERPL DL<=0.005 MIU/L-ACNC: 1.25 UIU/ML (ref 0.3–3.7)
WBC # BLD AUTO: 3.6 K/UL (ref 4.8–10.8)
WBC # BLD AUTO: 3.6 K/UL (ref 4.8–10.8)

## 2017-09-14 PROCEDURE — 88237 TISSUE CULTURE BONE MARROW: CPT

## 2017-09-14 PROCEDURE — 160048 HCHG OR STATISTICAL LEVEL 1-5: Performed by: HOSPITALIST

## 2017-09-14 PROCEDURE — 87103 BLOOD FUNGUS CULTURE: CPT

## 2017-09-14 PROCEDURE — 38221 DX BONE MARROW BIOPSIES: CPT | Performed by: HOSPITALIST

## 2017-09-14 PROCEDURE — 700111 HCHG RX REV CODE 636 W/ 250 OVERRIDE (IP)

## 2017-09-14 PROCEDURE — 88264 CHROMOSOME ANALYSIS 20-25: CPT

## 2017-09-14 PROCEDURE — 87040 BLOOD CULTURE FOR BACTERIA: CPT

## 2017-09-14 PROCEDURE — 88313 SPECIAL STAINS GROUP 2: CPT

## 2017-09-14 PROCEDURE — 85046 RETICYTE/HGB CONCENTRATE: CPT

## 2017-09-14 PROCEDURE — 88185 FLOWCYTOMETRY/TC ADD-ON: CPT | Mod: 91

## 2017-09-14 PROCEDURE — 99153 MOD SED SAME PHYS/QHP EA: CPT | Performed by: HOSPITALIST

## 2017-09-14 PROCEDURE — 88311 DECALCIFY TISSUE: CPT

## 2017-09-14 PROCEDURE — 99152 MOD SED SAME PHYS/QHP 5/>YRS: CPT | Performed by: HOSPITALIST

## 2017-09-14 PROCEDURE — 160035 HCHG PACU - 1ST 60 MINS PHASE I: Performed by: HOSPITALIST

## 2017-09-14 PROCEDURE — 88291 CYTO/MOLECULAR REPORT: CPT

## 2017-09-14 PROCEDURE — 88305 TISSUE EXAM BY PATHOLOGIST: CPT | Mod: 59

## 2017-09-14 PROCEDURE — 88312 SPECIAL STAINS GROUP 1: CPT | Mod: 59

## 2017-09-14 PROCEDURE — 85025 COMPLETE CBC W/AUTO DIFF WBC: CPT

## 2017-09-14 PROCEDURE — 700105 HCHG RX REV CODE 258: Performed by: HOSPITALIST

## 2017-09-14 PROCEDURE — 160002 HCHG RECOVERY MINUTES (STAT): Performed by: HOSPITALIST

## 2017-09-14 PROCEDURE — 88184 FLOWCYTOMETRY/ TC 1 MARKER: CPT

## 2017-09-14 PROCEDURE — 160027 HCHG SURGERY MINUTES - 1ST 30 MINS LEVEL 2: Performed by: HOSPITALIST

## 2017-09-14 RX ORDER — SODIUM CHLORIDE 9 MG/ML
500 INJECTION, SOLUTION INTRAVENOUS
Status: DISCONTINUED | OUTPATIENT
Start: 2017-09-14 | End: 2017-09-14 | Stop reason: HOSPADM

## 2017-09-14 RX ORDER — MIDAZOLAM HYDROCHLORIDE 1 MG/ML
INJECTION INTRAMUSCULAR; INTRAVENOUS
Status: DISCONTINUED | OUTPATIENT
Start: 2017-09-14 | End: 2017-09-14 | Stop reason: HOSPADM

## 2017-09-14 ASSESSMENT — PAIN SCALES - GENERAL
PAINLEVEL_OUTOF10: 0
PAINLEVEL_OUTOF10: 0

## 2017-09-14 NOTE — OR NURSING
1310 Received pt from endo, received report from endo RN. Pt awake and alert, VS WNL. Pt tolerating fluids.     1320 Pt meets criteria for discharge, pt and and Wife given instructions for discharge, evidence of understanding demonstrated.     1330 Pt out to car in WC.

## 2017-09-14 NOTE — DISCHARGE INSTRUCTIONS
BONE MARROW ASPIRATION & BIOPSY DISCHARGE INSTRUCTIONS    1. After the numbing medicine wears off, you may feel some discomfort.    2. Keep bandage clean and dry for 24 hours.  After this time, you can change the bandage.  You may now bathe or shower.    3. If bleeding occurs after your bone marrow aspiration or biopsy, apply pressure to the area and call your doctor immediately.    4. Call your doctor if pain persists for greater than 24 hours in the area where you had your aspiration or biopsy.    5. Call your doctor immediately if you notice redness or drainage in the area or if you have a fever.    6. Call your doctor if you have numbness or weakness in the area where the doctor took the bone marrow or down your leg.    7. Do not drive or drink alcohol for 24 hours if you have had sedation medication.  The medication will make you drowsy.    8. Resume your regular diet.    9. Follow up with your doctor.         I acknowledge receipt and understanding of these Home Care Instructions.

## 2017-09-14 NOTE — PROCEDURES
DATE OF PROCEDURE: 9/14/2017    PROCEDURE: Bone marrow biopsy with bone marrow aspiration.     REQUESTING PHYSICIAN: Dr. Cartagena      INDICATIONS: Pancytopenia    INFORMED CONSENT: Consent signed and on the chart.     DESCRIPTION: A time out was called. The patient was placed in the prone position. The left buttock was prepped and draped in a sterile fashion.  1 mL of 1% lidocaine was injected into the skin followed by 3 mL into the periosteum of the posterior ilium bone. Large-bore needle was used to obtain 10 mL of aspirate for culture. 5ml of nonheparinzed aspirate followed by 5 mL   into a heparinized syringe for flow cytometry. This followed by a  bone marrow biopsy using the Jamshidi needle.     SEDATION USED: 3mg IV Versed and 50mcg IV fentanyl    ESTIMATED BLOOD LOSS: none

## 2017-09-15 ENCOUNTER — HOSPITAL ENCOUNTER (OUTPATIENT)
Facility: MEDICAL CENTER | Age: 37
End: 2017-09-15
Attending: INTERNAL MEDICINE
Payer: COMMERCIAL

## 2017-09-15 ENCOUNTER — HOSPITAL ENCOUNTER (OUTPATIENT)
Dept: LAB | Facility: MEDICAL CENTER | Age: 37
End: 2017-09-15
Attending: FAMILY MEDICINE
Payer: COMMERCIAL

## 2017-09-15 ENCOUNTER — TELEPHONE (OUTPATIENT)
Dept: ENDOCRINOLOGY | Facility: MEDICAL CENTER | Age: 37
End: 2017-09-15

## 2017-09-15 DIAGNOSIS — D35.2 PITUITARY ADENOMA (HCC): ICD-10-CM

## 2017-09-15 DIAGNOSIS — E22.1 HYPERPROLACTINEMIA (HCC): ICD-10-CM

## 2017-09-15 DIAGNOSIS — E23.0 HYPOPITUITARISM (HCC): Primary | ICD-10-CM

## 2017-09-15 DIAGNOSIS — E23.0 HYPOPITUITARISM (HCC): ICD-10-CM

## 2017-09-15 LAB
IGF-I SERPL-MCNC: 139 NG/ML (ref 132–333)
T3FREE SERPL-MCNC: 2.94 PG/ML (ref 2.4–4.2)

## 2017-09-15 PROCEDURE — 84481 FREE ASSAY (FT-3): CPT

## 2017-09-15 PROCEDURE — 36415 COLL VENOUS BLD VENIPUNCTURE: CPT

## 2017-09-15 PROCEDURE — 82024 ASSAY OF ACTH: CPT

## 2017-09-15 PROCEDURE — 84443 ASSAY THYROID STIM HORMONE: CPT

## 2017-09-15 PROCEDURE — 84439 ASSAY OF FREE THYROXINE: CPT

## 2017-09-15 PROCEDURE — 82533 TOTAL CORTISOL: CPT

## 2017-09-15 PROCEDURE — 84402 ASSAY OF FREE TESTOSTERONE: CPT

## 2017-09-15 PROCEDURE — 87799 DETECT AGENT NOS DNA QUANT: CPT

## 2017-09-15 PROCEDURE — 84146 ASSAY OF PROLACTIN: CPT

## 2017-09-15 RX ORDER — HYDROCORTISONE 10 MG/1
10 TABLET ORAL 2 TIMES DAILY
Qty: 60 TAB | Refills: 6 | Status: SHIPPED | OUTPATIENT
Start: 2017-09-15 | End: 2018-04-08 | Stop reason: SDUPTHER

## 2017-09-15 NOTE — TELEPHONE ENCOUNTER
Telephone conversation with Dr. Deluca and patient.    Dr. 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 Prolactin > 2000 and by description he is probably hypopit. A morning cortisol is only 3.3. No ACTH done. IGF 1 low normal and testosterone low. This is Friday afternoon and I can't get him into the office right now because we are not staffed. His vision is not impaired and the tumor is not abut the optic chiasm.    I'm going to have him go to the lab and confirm the prolactin level. Get a free T4 and TSH. Another cortisol and ACTH. As a hedge I'm going to start hydrocortisone 10 mg twice a day. I cautioned him that he probably has some degree of adrenal insufficiency and wouldn't tolerate any sort of significant stress or injury without stress doses of steroids.    I will see him Monday morning and plan to start cabergoline. I will not start thyroid replacement if necessary until his adrenal replacement is adequate.    Dr. Deluca does not plan on surgery pending results of cabergoline therapy.    Rogerio Swenson M.D.    Copies to Dr. Valorie Deluca

## 2017-09-16 LAB
CORTIS SERPL-MCNC: 2.3 UG/DL (ref 0–23)
CRYOGLOB SER QL 3D COLD INC: NORMAL
PROLACTIN SERPL-MCNC: 751.25 NG/ML (ref 2.1–17.7)
T4 FREE SERPL-MCNC: 0.33 NG/DL (ref 0.53–1.43)
TSH SERPL DL<=0.005 MIU/L-ACNC: 0.79 UIU/ML (ref 0.3–3.7)

## 2017-09-17 LAB
ACTH PLAS-MCNC: 12 PG/ML (ref 7–69)
LYSOZYME SERPL-MCNC: 0.74 UG/ML (ref 0–2.75)
TESTOST FREE SERPL-MCNC: 13 PG/ML (ref 47–244)

## 2017-09-18 ENCOUNTER — OFFICE VISIT (OUTPATIENT)
Dept: ENDOCRINOLOGY | Facility: MEDICAL CENTER | Age: 37
End: 2017-09-18
Payer: COMMERCIAL

## 2017-09-18 VITALS
HEIGHT: 68 IN | SYSTOLIC BLOOD PRESSURE: 102 MMHG | HEART RATE: 79 BPM | DIASTOLIC BLOOD PRESSURE: 68 MMHG | OXYGEN SATURATION: 99 % | WEIGHT: 172 LBS | BODY MASS INDEX: 26.07 KG/M2

## 2017-09-18 DIAGNOSIS — E23.0 HYPOPITUITARISM (HCC): ICD-10-CM

## 2017-09-18 DIAGNOSIS — E22.1 HYPERPROLACTINEMIA (HCC): Primary | ICD-10-CM

## 2017-09-18 DIAGNOSIS — D35.2 PITUITARY ADENOMA (HCC): ICD-10-CM

## 2017-09-18 LAB
DIAGNOSTIC IMP SPEC-IMP: NOT DETECTED
EBV DNA # SPEC NAA+PROBE: <390 CPY/ML
EBV DNA SPEC NAA+PROBE-LOG#: <2.6 LOG
SPECIMEN SOURCE: NORMAL

## 2017-09-18 PROCEDURE — 99204 OFFICE O/P NEW MOD 45 MIN: CPT | Performed by: INTERNAL MEDICINE

## 2017-09-18 RX ORDER — CABERGOLINE 0.5 MG/1
0.25 TABLET ORAL
Qty: 8 TAB | Refills: 6 | Status: SHIPPED | OUTPATIENT
Start: 2017-09-18 | End: 2017-10-18

## 2017-09-18 NOTE — PROGRESS NOTES
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    PRESENT ILLNESS:      The patient has had declining health over the past year or two.  He has had progressive weakness and loss of muscle strength.  His libido has diminished.  Over the past one month, he has had a left sided headache.  He has had abnormal liver enzymes of unknown etiology.  In August of this year, his SGOT was 121 and SGPT 238.  Bilirubin normal and alkaline phosphotase normal as well.  He underwent a liver biopsy which reportedly is normal.  He also had a degree of mild pancytopenia.  In July his white count was 3900 with 44 polys, hemoglobin slightly low at 13.6 and platelets 175,000 which is low normal.  He has had a bone marrow biopsy which is also unremarkable.  More recently he had an MRI September 12.  This has demonstrated a 2 cm mass within the sella tursica and causing a mass effect on the pre chiasmatic portions of the optic nerve root.  He appeared to have some necrosis.  Dr. Deluca postulates that he may have had a degree of bleeding into this tumor at one point.  He does not have visual disturbance however. His visual fields are intact.      Pertinent pituitary testing indicates a prolactin greater than 2000 done on September 12. I had a repeat done on September 15 of 751.  His free testosterone is low at 0.3 (0.5-1.4).  TSH low normal at 0.7.  Morning cortisol 3.3 and on repeat 2.3 in the afternoon with an ACTH of 12.  A few days ago I started him on hydrocortisone 10 mg b.i.d.  IgF-1 is normal at 139 (132-333).     I think we can say this is a prolactinoma.  Dr. Deluca does not have plans for surgery in as much as his vision is not impaired.  I will begin cabergoline .25 mg twice weekly.  In two weeks, we will update his prolactin  level and he will converse with me as to his tolerance for cabergoline.  I have suggested he take it in the evening.  There may be some nausea and dizziness associated with it.  At that point, I am assuming we will increase his dose to .5 mg twice a week and I will see him again in one month for follow up.  We will continue low dose hydrocortisone.  I advised him that this is imperative for illness or injury.  He should double and triple his dose at the first sign of any illness or untoward effect.  If he has intractable vomiting and cannot hold his hydrocortisone down, he should report to the emergency room.  Also he should wear a medic alert type medallion or bracelet.      I am not starting thyroid replacement now until he is better established on hydrocortisone.  Also I will not supplement with testosterone.  I am thinking that as his prolactin comes down, his testosterone level will probably increase.  That remains to be seen.  Next appointment one month.       ROS:  All other systems reported as negative except as stated in history of present illness      Allergies:   Allergies   Allergen Reactions   • Avocado        Current medicines including changes today:  Current Outpatient Prescriptions   Medication Sig Dispense Refill   • cabergoline (DOSTINEX) 0.5 MG tablet Take 0.5 Tabs by mouth 2X A WEEK. 8 Tab 6   • hydrocortisone (CORTEF) 10 MG Tab Take 1 Tab by mouth 2 times a day. 60 Tab 6   • ibuprofen (MOTRIN) 400 MG Tab Take 200 mg by mouth every 6 hours as needed.       No current facility-administered medications for this visit.         Past Medical History:   Diagnosis Date   • Hemorrhagic disorder (CMS-HCC)     anemia   • Pituitary tumor      Family history       No family history of pituitary disease or other endocrine tumors.    Social history        Patient is . Has 2 children 3-year-old and 19 months old. He is the father of both.        Does not smoke cigarettes. Does not use marijuana or  "illicit drugs.        Formally stopped using alcohol since he has been ill.    PHYSICAL EXAM:    /68   Pulse 79   Ht 1.727 m (5' 8\")   Wt 78 kg (172 lb)   SpO2 99%   BMI 26.15 kg/m²      Gen.   appears healthy     Skin   appropriate for sex and age    HEENT  visual fields are full to gross confrontation    Neck   thyroid gland is palpably normal    Lungs  clear    Heart  regular     Breasts    no gynecomastia    Abdomen   liver is not enlarged or tender. I cannot feel his spleen.    Extremities  no edema    Neuro  gait and station normal    Psych  appropriate    ASSESSMENT AND RECOMMENDATIONS    1. Pituitary adenoma (CMS-[facility])              Presumptive prolactinoma    2. Hyperprolactinemia (CMS-[facility])    - PROLACTIN; Standing  - cabergoline (DOSTINEX) 0.5 MG tablet; Take 0.5 Tabs by mouth 2X A WEEK.  Dispense: 8 Tab; Refill: 6    3. Hypopituitarism (CMS-[facility])           Have started hydrocortisone 10 mg twice a day with cautions about increasing dose with stress discussed           Medic alert bracelet or medallion advised           Will not supplement thyroid until established on adrenal replacement.           No testosterone supplement. Blood levels may increase as prolactin decreases      DISPOSITION:  Repeat prolactin in 2 weeks and adjust dose of cabergoline.                             Return to office in one month       Rogerio Swenson M.D.    Copies to: Stefano PARIS M.D. [phone]                   Dr. WEI Benitez  "

## 2017-09-19 LAB
BACTERIA BLD CULT: NORMAL
SIGNIFICANT IND 70042: NORMAL
SITE SITE: NORMAL
SOURCE SOURCE: NORMAL

## 2017-10-11 LAB
FUNGUS BLD CULT: NORMAL
SIGNIFICANT IND 70042: NORMAL
SITE SITE: NORMAL
SOURCE SOURCE: NORMAL

## 2017-10-14 ENCOUNTER — HOSPITAL ENCOUNTER (OUTPATIENT)
Dept: LAB | Facility: MEDICAL CENTER | Age: 37
End: 2017-10-14
Attending: INTERNAL MEDICINE
Payer: COMMERCIAL

## 2017-10-14 ENCOUNTER — HOSPITAL ENCOUNTER (OUTPATIENT)
Dept: LAB | Facility: MEDICAL CENTER | Age: 37
End: 2017-10-14
Attending: OPHTHALMOLOGY
Payer: COMMERCIAL

## 2017-10-14 DIAGNOSIS — E22.1 HYPERPROLACTINEMIA (HCC): ICD-10-CM

## 2017-10-14 LAB
ALT SERPL-CCNC: 26 U/L (ref 2–50)
AST SERPL-CCNC: 20 U/L (ref 12–45)
BASOPHILS # BLD AUTO: 1.3 % (ref 0–1.8)
BASOPHILS # BLD: 0.06 K/UL (ref 0–0.12)
EOSINOPHIL # BLD AUTO: 0.05 K/UL (ref 0–0.51)
EOSINOPHIL NFR BLD: 1.1 % (ref 0–6.9)
ERYTHROCYTE [DISTWIDTH] IN BLOOD BY AUTOMATED COUNT: 40.9 FL (ref 35.9–50)
HCT VFR BLD AUTO: 43.6 % (ref 42–52)
HGB BLD-MCNC: 14.8 G/DL (ref 14–18)
IMM GRANULOCYTES # BLD AUTO: 0.01 K/UL (ref 0–0.11)
IMM GRANULOCYTES NFR BLD AUTO: 0.2 % (ref 0–0.9)
LYMPHOCYTES # BLD AUTO: 1.99 K/UL (ref 1–4.8)
LYMPHOCYTES NFR BLD: 42.5 % (ref 22–41)
MCH RBC QN AUTO: 30.6 PG (ref 27–33)
MCHC RBC AUTO-ENTMCNC: 33.9 G/DL (ref 33.7–35.3)
MCV RBC AUTO: 90.3 FL (ref 81.4–97.8)
MONOCYTES # BLD AUTO: 0.24 K/UL (ref 0–0.85)
MONOCYTES NFR BLD AUTO: 5.1 % (ref 0–13.4)
NEUTROPHILS # BLD AUTO: 2.33 K/UL (ref 1.82–7.42)
NEUTROPHILS NFR BLD: 49.8 % (ref 44–72)
NRBC # BLD AUTO: 0 K/UL
NRBC BLD AUTO-RTO: 0 /100 WBC
PLATELET # BLD AUTO: 152 K/UL (ref 164–446)
PMV BLD AUTO: 10.7 FL (ref 9–12.9)
PROLACTIN SERPL-MCNC: 295.37 NG/ML (ref 2.1–17.7)
RBC # BLD AUTO: 4.83 M/UL (ref 4.7–6.1)
WBC # BLD AUTO: 4.7 K/UL (ref 4.8–10.8)

## 2017-10-14 PROCEDURE — 84450 TRANSFERASE (AST) (SGOT): CPT

## 2017-10-14 PROCEDURE — 85025 COMPLETE CBC W/AUTO DIFF WBC: CPT

## 2017-10-14 PROCEDURE — 36415 COLL VENOUS BLD VENIPUNCTURE: CPT

## 2017-10-14 PROCEDURE — 84460 ALANINE AMINO (ALT) (SGPT): CPT

## 2017-10-14 PROCEDURE — 84146 ASSAY OF PROLACTIN: CPT

## 2017-10-18 ENCOUNTER — OFFICE VISIT (OUTPATIENT)
Dept: ENDOCRINOLOGY | Facility: MEDICAL CENTER | Age: 37
End: 2017-10-18
Payer: COMMERCIAL

## 2017-10-18 VITALS
DIASTOLIC BLOOD PRESSURE: 78 MMHG | WEIGHT: 172 LBS | HEART RATE: 74 BPM | HEIGHT: 68 IN | OXYGEN SATURATION: 99 % | BODY MASS INDEX: 26.07 KG/M2 | SYSTOLIC BLOOD PRESSURE: 122 MMHG

## 2017-10-18 DIAGNOSIS — D35.2 PITUITARY ADENOMA (HCC): ICD-10-CM

## 2017-10-18 DIAGNOSIS — R07.9 CHEST PAIN, UNSPECIFIED TYPE: ICD-10-CM

## 2017-10-18 DIAGNOSIS — E23.0 HYPOPITUITARISM (HCC): ICD-10-CM

## 2017-10-18 DIAGNOSIS — E22.1 HYPERPROLACTINEMIA (HCC): ICD-10-CM

## 2017-10-18 PROCEDURE — 99214 OFFICE O/P EST MOD 30 MIN: CPT | Performed by: INTERNAL MEDICINE

## 2017-10-18 RX ORDER — LEVOTHYROXINE SODIUM 0.05 MG/1
50 TABLET ORAL
COMMUNITY

## 2017-10-18 RX ORDER — CABERGOLINE 0.5 MG/1
0.75 TABLET ORAL
Qty: 12 TAB | Refills: 3 | Status: SHIPPED | OUTPATIENT
Start: 2017-10-18

## 2017-10-19 NOTE — PROGRESS NOTES
Chief Complaint   Patient presents with   • Pituitary Adenoma     hyperprolactinemia   • Hypopituitarism        HPI:    1.  Prolactinoma           The patient is tolerating cabergoline 0.5 mg twice weekly well. Headaches are diminishing but still present particularly on the left side. Peripheral vision is intact.            Current prolactin level has come down from 751 down to 295. He is tolerating cabergoline well so I will increase the dose 0.75 twice weekly and repeat his prolactin next month.            He has been to [place] to see Dr. Batool Salas. I believe he was referred by Dr. Deluca who is now retiring.     2.  Hypopituitarism.            I had previously started him on hydrocortisone 10 mg twice a day. It Fer they added levothyroxine 50 µg per day. He has a busy schedule and he is having difficulty taking his thyroid early in the morning and then leading his stomach empty long enough for absorption. I suggested that we switch to brand Synthroid and he can try taking a trans-buccal which I know has worked for others. He is going to update a thyroid level in about 2 weeks and I will add the prolactin at that time.      3.  Pituitary adenoma            Dr. Batool Salas is viewed his pituitary MRI and noted the cystic component. He wonders if that will shrink with cabergoline. I have read the report indicating there has been shrinkage of pituitary cysts with cabergoline.             No definite timeframe to repeat the pituitary MRI. My plan was to repeat it when his prolactin level got down to near normal        ROS:  He is experiencing a peculiar sensation when he lies supine. It has a sensation of something crackling. It is not a pain. He is not particularly short of breath. It definitely is not pleuritic pain that he is describing. It is very reproducible each time he lies supine. It goes away when he sits up. My examination of his heart and lungs and chest wall is unremarkable. I did do an  "electrocardiogram is also normal and does not suggest pericarditis or pericardial effusion. I will get a chest x-ray also.    All other systems reported as negative or unchanged since last exam      Allergies:   Allergies   Allergen Reactions   • Avocado        Current medicines including changes today:  Current Outpatient Prescriptions   Medication Sig Dispense Refill   • levothyroxine (SYNTHROID) 50 MCG Tab Take 50 mcg by mouth Every morning on an empty stomach.     • cabergoline (DOSTINEX) 0.5 MG tablet Take 1.5 Tabs by mouth 2X A WEEK. 12 Tab 3   • hydrocortisone (CORTEF) 10 MG Tab Take 1 Tab by mouth 2 times a day. 60 Tab 6   • ibuprofen (MOTRIN) 400 MG Tab Take 200 mg by mouth every 6 hours as needed.       No current facility-administered medications for this visit.         Past Medical History:   Diagnosis Date   • Hemorrhagic disorder (CMS-[facility])     anemia   • Pituitary tumor        PHYSICAL EXAM:    /78   Pulse 74   Ht 1.727 m (5' 8\")   Wt 78 kg (172 lb)   SpO2 99%   BMI 26.15 kg/m²      Gen.   appears healthy     Skin   appropriate for sex and age    HEENT  unremarkable    Neck   no adenopathy, thyroid gland is small and difficult to palpate    Lungs  clear, no rub rales or wheezes     Breasts  normal male. No glandular tissue    Heart  regular, no murmur or rub    Extremities  no edema    Neuro  gait and station normal    Psych  appropriate    ASSESSMENT AND RECOMMENDATIONS    1. Hyperprolactinemia (CMS-[facility])            Responding to cabergoline 0.5 mg twice weekly.  - PROLACTIN; in 2-3 weeks    -Increase cabergoline (DOSTINEX) 0.5 MG tablet; Take 1.5 Tabs by mouth 2X A WEEK.  Dispense: 12 Tab; Refill: 3    2. Hypopituitarism (CMS-[facility])             Discussed taking brand Synthroid 50 µg transbuccal for convenience in the morning    3. Pituitary adenoma (CMS-[facility])    4. Chest pain, unspecified type, unclear etiology              EKG is normal. No suggestion of pericardial effusion               - " DX-CHEST-2 VIEWS; Future      DISPOSITION:  Discussed with Dr. Batool Salas to coordinate efforts       Rogerio Swenson M.D.    Copies to: Stefano PARIS M.D. [phone]                Dr. Batool Salas

## 2017-11-04 ENCOUNTER — HOSPITAL ENCOUNTER (OUTPATIENT)
Dept: LAB | Facility: MEDICAL CENTER | Age: 37
End: 2017-11-04
Attending: NURSE PRACTITIONER
Payer: COMMERCIAL

## 2017-11-04 ENCOUNTER — HOSPITAL ENCOUNTER (OUTPATIENT)
Dept: LAB | Facility: MEDICAL CENTER | Age: 37
End: 2017-11-04
Attending: INTERNAL MEDICINE
Payer: COMMERCIAL

## 2017-11-04 DIAGNOSIS — E22.1 HYPERPROLACTINEMIA (HCC): ICD-10-CM

## 2017-11-04 LAB
PROLACTIN SERPL-MCNC: 189.57 NG/ML (ref 2.1–17.7)
T4 FREE SERPL-MCNC: 0.77 NG/DL (ref 0.53–1.43)

## 2017-11-04 PROCEDURE — 84439 ASSAY OF FREE THYROXINE: CPT

## 2017-11-04 PROCEDURE — 84146 ASSAY OF PROLACTIN: CPT

## 2017-11-04 PROCEDURE — 36415 COLL VENOUS BLD VENIPUNCTURE: CPT

## 2017-11-05 DIAGNOSIS — E22.1 HYPERPROLACTINEMIA (HCC): ICD-10-CM

## 2017-11-06 ENCOUNTER — TELEPHONE (OUTPATIENT)
Dept: ENDOCRINOLOGY | Facility: MEDICAL CENTER | Age: 37
End: 2017-11-06

## 2017-11-27 DIAGNOSIS — D35.2 PITUITARY ADENOMA (HCC): ICD-10-CM

## 2017-11-27 DIAGNOSIS — E23.0 HYPOPITUITARISM (HCC): Primary | ICD-10-CM

## 2017-11-27 DIAGNOSIS — E22.1 HYPERPROLACTINEMIA (HCC): ICD-10-CM

## 2017-12-06 ENCOUNTER — HOSPITAL ENCOUNTER (OUTPATIENT)
Dept: LAB | Facility: MEDICAL CENTER | Age: 37
End: 2017-12-06
Attending: INTERNAL MEDICINE
Payer: COMMERCIAL

## 2017-12-06 DIAGNOSIS — E23.0 HYPOPITUITARISM (HCC): ICD-10-CM

## 2017-12-06 DIAGNOSIS — E22.1 HYPERPROLACTINEMIA (HCC): ICD-10-CM

## 2017-12-06 DIAGNOSIS — D35.2 PITUITARY ADENOMA (HCC): ICD-10-CM

## 2017-12-06 LAB
ANION GAP SERPL CALC-SCNC: 4 MMOL/L (ref 0–11.9)
BUN SERPL-MCNC: 15 MG/DL (ref 8–22)
CALCIUM SERPL-MCNC: 9.6 MG/DL (ref 8.5–10.5)
CHLORIDE SERPL-SCNC: 105 MMOL/L (ref 96–112)
CO2 SERPL-SCNC: 30 MMOL/L (ref 20–33)
CREAT SERPL-MCNC: 1.01 MG/DL (ref 0.5–1.4)
GFR SERPL CREATININE-BSD FRML MDRD: >60 ML/MIN/1.73 M 2
GLUCOSE SERPL-MCNC: 89 MG/DL (ref 65–99)
POTASSIUM SERPL-SCNC: 4 MMOL/L (ref 3.6–5.5)
PROLACTIN SERPL-MCNC: 131.58 NG/ML (ref 2.1–17.7)
SODIUM SERPL-SCNC: 139 MMOL/L (ref 135–145)
T4 FREE SERPL-MCNC: 0.7 NG/DL (ref 0.53–1.43)

## 2017-12-06 PROCEDURE — 84146 ASSAY OF PROLACTIN: CPT

## 2017-12-06 PROCEDURE — 84402 ASSAY OF FREE TESTOSTERONE: CPT

## 2017-12-06 PROCEDURE — 36415 COLL VENOUS BLD VENIPUNCTURE: CPT

## 2017-12-06 PROCEDURE — 84439 ASSAY OF FREE THYROXINE: CPT

## 2017-12-06 PROCEDURE — 80048 BASIC METABOLIC PNL TOTAL CA: CPT

## 2017-12-06 PROCEDURE — 84305 ASSAY OF SOMATOMEDIN: CPT

## 2017-12-07 LAB — TESTOST FREE SERPL-MCNC: 49 PG/ML (ref 47–244)

## 2017-12-08 LAB
IGF-I SERPL-MCNC: 176 NG/ML (ref 83–239)
IGF-I Z-SCORE SERPL: 0.6

## 2018-01-19 ENCOUNTER — HOSPITAL ENCOUNTER (OUTPATIENT)
Dept: RADIOLOGY | Facility: MEDICAL CENTER | Age: 38
End: 2018-01-19
Attending: INTERNAL MEDICINE
Payer: COMMERCIAL

## 2018-01-26 ENCOUNTER — APPOINTMENT (OUTPATIENT)
Dept: RADIOLOGY | Facility: MEDICAL CENTER | Age: 38
End: 2018-01-26
Attending: INTERNAL MEDICINE
Payer: COMMERCIAL

## 2018-01-26 ENCOUNTER — HOSPITAL ENCOUNTER (OUTPATIENT)
Dept: CARDIOLOGY | Facility: MEDICAL CENTER | Age: 38
End: 2018-01-26
Attending: FAMILY MEDICINE
Payer: COMMERCIAL

## 2018-01-26 DIAGNOSIS — R06.9 ABNORMAL RESPIRATORY RATE: ICD-10-CM

## 2018-01-26 LAB
LV EJECT FRACT  99904: 65
LV EJECT FRACT MOD 2C 99903: 76.29
LV EJECT FRACT MOD 4C 99902: 75.78
LV EJECT FRACT MOD BP 99901: 76.92

## 2018-01-26 PROCEDURE — 93306 TTE W/DOPPLER COMPLETE: CPT | Mod: 26 | Performed by: INTERNAL MEDICINE

## 2018-01-26 PROCEDURE — 93306 TTE W/DOPPLER COMPLETE: CPT

## 2018-01-28 ENCOUNTER — HOSPITAL ENCOUNTER (OUTPATIENT)
Dept: RADIOLOGY | Facility: MEDICAL CENTER | Age: 38
End: 2018-01-28
Attending: INTERNAL MEDICINE
Payer: COMMERCIAL

## 2018-01-28 DIAGNOSIS — D35.2 PITUITARY ADENOMA (HCC): ICD-10-CM

## 2018-01-28 PROCEDURE — 700117 HCHG RX CONTRAST REV CODE 255: Performed by: INTERNAL MEDICINE

## 2018-01-28 PROCEDURE — 70553 MRI BRAIN STEM W/O & W/DYE: CPT

## 2018-01-28 PROCEDURE — A9579 GAD-BASE MR CONTRAST NOS,1ML: HCPCS | Performed by: INTERNAL MEDICINE

## 2018-01-28 RX ADMIN — GADODIAMIDE 17 ML: 287 INJECTION INTRAVENOUS at 13:36

## 2018-01-29 ENCOUNTER — HOSPITAL ENCOUNTER (OUTPATIENT)
Dept: LAB | Facility: MEDICAL CENTER | Age: 38
End: 2018-01-29
Attending: INTERNAL MEDICINE
Payer: COMMERCIAL

## 2018-01-29 DIAGNOSIS — E22.1 HYPERPROLACTINEMIA (HCC): ICD-10-CM

## 2018-01-29 LAB
PROLACTIN SERPL-MCNC: 99.44 NG/ML (ref 2.1–17.7)
T4 FREE SERPL-MCNC: 1.09 NG/DL (ref 0.53–1.43)
TESTOST SERPL-MCNC: 300 NG/DL (ref 175–781)

## 2018-01-29 PROCEDURE — 36415 COLL VENOUS BLD VENIPUNCTURE: CPT

## 2018-01-29 PROCEDURE — 84439 ASSAY OF FREE THYROXINE: CPT

## 2018-01-29 PROCEDURE — 84146 ASSAY OF PROLACTIN: CPT

## 2018-01-29 PROCEDURE — 84403 ASSAY OF TOTAL TESTOSTERONE: CPT

## 2018-04-08 DIAGNOSIS — E23.0 HYPOPITUITARISM (HCC): ICD-10-CM

## 2018-04-09 RX ORDER — HYDROCORTISONE 10 MG/1
10 TABLET ORAL 2 TIMES DAILY
Qty: 60 TAB | Refills: 3 | Status: SHIPPED | OUTPATIENT
Start: 2018-04-09 | End: 2018-08-07 | Stop reason: SDUPTHER

## 2018-06-16 ENCOUNTER — HOSPITAL ENCOUNTER (OUTPATIENT)
Dept: LAB | Facility: MEDICAL CENTER | Age: 38
End: 2018-06-16
Attending: NURSE PRACTITIONER
Payer: COMMERCIAL

## 2018-06-16 LAB
PROLACTIN SERPL-MCNC: 111.81 NG/ML (ref 2.1–17.7)
T4 FREE SERPL-MCNC: 1 NG/DL (ref 0.53–1.43)
TESTOST SERPL-MCNC: 267 NG/DL (ref 175–781)

## 2018-06-16 PROCEDURE — 36415 COLL VENOUS BLD VENIPUNCTURE: CPT

## 2018-06-16 PROCEDURE — 84403 ASSAY OF TOTAL TESTOSTERONE: CPT

## 2018-06-16 PROCEDURE — 84305 ASSAY OF SOMATOMEDIN: CPT

## 2018-06-16 PROCEDURE — 84146 ASSAY OF PROLACTIN: CPT

## 2018-06-16 PROCEDURE — 84439 ASSAY OF FREE THYROXINE: CPT

## 2018-06-18 LAB
IGF-I SERPL-MCNC: 224 NG/ML (ref 83–238)
IGF-I Z-SCORE SERPL: 1.5

## 2018-08-07 DIAGNOSIS — E23.0 HYPOPITUITARISM (HCC): ICD-10-CM

## 2018-08-07 RX ORDER — HYDROCORTISONE 10 MG/1
10 TABLET ORAL 2 TIMES DAILY
Qty: 60 TAB | Refills: 0 | Status: SHIPPED | OUTPATIENT
Start: 2018-08-07

## 2018-08-07 NOTE — TELEPHONE ENCOUNTER
Was the patient seen in the last year in this department? Yes    Does patient have an active prescription for medications requested? No     Received Request Via: Pharmacy     hydrocortisone (CORTEF) 10 MG Tab

## 2018-11-03 ENCOUNTER — HOSPITAL ENCOUNTER (OUTPATIENT)
Dept: LAB | Facility: MEDICAL CENTER | Age: 38
End: 2018-11-03
Attending: OPHTHALMOLOGY
Payer: COMMERCIAL

## 2018-11-03 LAB
PROLACTIN SERPL-MCNC: 84.33 NG/ML (ref 2.1–17.7)
T3 SERPL-MCNC: 88.4 NG/DL (ref 60–181)
T4 FREE SERPL-MCNC: 1.04 NG/DL (ref 0.53–1.43)
T4 SERPL-MCNC: 9.3 UG/DL (ref 4–12)

## 2018-11-03 PROCEDURE — 36415 COLL VENOUS BLD VENIPUNCTURE: CPT

## 2018-11-03 PROCEDURE — 84480 ASSAY TRIIODOTHYRONINE (T3): CPT

## 2018-11-03 PROCEDURE — 84403 ASSAY OF TOTAL TESTOSTERONE: CPT

## 2018-11-03 PROCEDURE — 84270 ASSAY OF SEX HORMONE GLOBUL: CPT

## 2018-11-03 PROCEDURE — 84439 ASSAY OF FREE THYROXINE: CPT

## 2018-11-03 PROCEDURE — 84146 ASSAY OF PROLACTIN: CPT

## 2018-11-06 LAB
SHBG SERPL-SCNC: 26 NMOL/L (ref 11–80)
TESTOST FREE MFR SERPL: 1.9 % (ref 1.6–2.9)
TESTOST FREE SERPL-MCNC: 28 PG/ML (ref 47–244)
TESTOST SERPL-MCNC: 143 NG/DL (ref 300–1080)

## 2019-05-25 ENCOUNTER — HOSPITAL ENCOUNTER (OUTPATIENT)
Dept: LAB | Facility: MEDICAL CENTER | Age: 39
End: 2019-05-25
Attending: OPHTHALMOLOGY
Payer: COMMERCIAL

## 2019-05-25 LAB
ANION GAP SERPL CALC-SCNC: 6 MMOL/L (ref 0–11.9)
BASOPHILS # BLD AUTO: 0.9 % (ref 0–1.8)
BASOPHILS # BLD: 0.04 K/UL (ref 0–0.12)
BUN SERPL-MCNC: 15 MG/DL (ref 8–22)
CALCIUM SERPL-MCNC: 9.4 MG/DL (ref 8.5–10.5)
CHLORIDE SERPL-SCNC: 106 MMOL/L (ref 96–112)
CO2 SERPL-SCNC: 29 MMOL/L (ref 20–33)
CREAT SERPL-MCNC: 1.03 MG/DL (ref 0.5–1.4)
EOSINOPHIL # BLD AUTO: 0.06 K/UL (ref 0–0.51)
EOSINOPHIL NFR BLD: 1.4 % (ref 0–6.9)
ERYTHROCYTE [DISTWIDTH] IN BLOOD BY AUTOMATED COUNT: 42.5 FL (ref 35.9–50)
FASTING STATUS PATIENT QL REPORTED: NORMAL
GLUCOSE SERPL-MCNC: 93 MG/DL (ref 65–99)
HCT VFR BLD AUTO: 44.7 % (ref 42–52)
HGB BLD-MCNC: 15.3 G/DL (ref 14–18)
IMM GRANULOCYTES # BLD AUTO: 0.01 K/UL (ref 0–0.11)
IMM GRANULOCYTES NFR BLD AUTO: 0.2 % (ref 0–0.9)
LYMPHOCYTES # BLD AUTO: 1.5 K/UL (ref 1–4.8)
LYMPHOCYTES NFR BLD: 35.4 % (ref 22–41)
MCH RBC QN AUTO: 32 PG (ref 27–33)
MCHC RBC AUTO-ENTMCNC: 34.2 G/DL (ref 33.7–35.3)
MCV RBC AUTO: 93.5 FL (ref 81.4–97.8)
MONOCYTES # BLD AUTO: 0.32 K/UL (ref 0–0.85)
MONOCYTES NFR BLD AUTO: 7.5 % (ref 0–13.4)
NEUTROPHILS # BLD AUTO: 2.31 K/UL (ref 1.82–7.42)
NEUTROPHILS NFR BLD: 54.6 % (ref 44–72)
NRBC # BLD AUTO: 0 K/UL
NRBC BLD-RTO: 0 /100 WBC
PLATELET # BLD AUTO: 144 K/UL (ref 164–446)
PMV BLD AUTO: 11.1 FL (ref 9–12.9)
POTASSIUM SERPL-SCNC: 4.2 MMOL/L (ref 3.6–5.5)
PROLACTIN SERPL-MCNC: 66.87 NG/ML (ref 2.1–17.7)
RBC # BLD AUTO: 4.78 M/UL (ref 4.7–6.1)
SODIUM SERPL-SCNC: 141 MMOL/L (ref 135–145)
T4 FREE SERPL-MCNC: 0.93 NG/DL (ref 0.53–1.43)
T4 SERPL-MCNC: 6.6 UG/DL (ref 4–12)
TESTOST SERPL-MCNC: 245 NG/DL (ref 175–781)
WBC # BLD AUTO: 4.2 K/UL (ref 4.8–10.8)

## 2019-05-25 PROCEDURE — 85025 COMPLETE CBC W/AUTO DIFF WBC: CPT

## 2019-05-25 PROCEDURE — 80048 BASIC METABOLIC PNL TOTAL CA: CPT

## 2019-05-25 PROCEDURE — 84403 ASSAY OF TOTAL TESTOSTERONE: CPT

## 2019-05-25 PROCEDURE — 36415 COLL VENOUS BLD VENIPUNCTURE: CPT

## 2019-05-25 PROCEDURE — 84305 ASSAY OF SOMATOMEDIN: CPT

## 2019-05-25 PROCEDURE — 84146 ASSAY OF PROLACTIN: CPT

## 2019-05-25 PROCEDURE — 84439 ASSAY OF FREE THYROXINE: CPT

## 2019-05-27 LAB
IGF-I SERPL-MCNC: 203 NG/ML (ref 83–238)
IGF-I Z-SCORE SERPL: 1.2

## 2019-06-15 ENCOUNTER — HOSPITAL ENCOUNTER (OUTPATIENT)
Dept: RADIOLOGY | Facility: MEDICAL CENTER | Age: 39
End: 2019-06-15
Attending: UROLOGY
Payer: COMMERCIAL

## 2019-06-15 DIAGNOSIS — R31.0 GROSS HEMATURIA: ICD-10-CM

## 2019-06-15 PROCEDURE — 74178 CT ABD&PLV WO CNTR FLWD CNTR: CPT

## 2019-06-15 PROCEDURE — 700117 HCHG RX CONTRAST REV CODE 255: Performed by: UROLOGY

## 2019-06-15 RX ADMIN — IOHEXOL 100 ML: 350 INJECTION, SOLUTION INTRAVENOUS at 08:46

## 2020-01-24 ENCOUNTER — APPOINTMENT (RX ONLY)
Dept: URBAN - METROPOLITAN AREA CLINIC 4 | Facility: CLINIC | Age: 40
Setting detail: DERMATOLOGY
End: 2020-01-24

## 2020-01-24 DIAGNOSIS — L57.8 OTHER SKIN CHANGES DUE TO CHRONIC EXPOSURE TO NONIONIZING RADIATION: ICD-10-CM

## 2020-01-24 DIAGNOSIS — D485 NEOPLASM OF UNCERTAIN BEHAVIOR OF SKIN: ICD-10-CM

## 2020-01-24 DIAGNOSIS — L82.1 OTHER SEBORRHEIC KERATOSIS: ICD-10-CM

## 2020-01-24 DIAGNOSIS — L81.4 OTHER MELANIN HYPERPIGMENTATION: ICD-10-CM

## 2020-01-24 DIAGNOSIS — D22 MELANOCYTIC NEVI: ICD-10-CM

## 2020-01-24 DIAGNOSIS — D18.0 HEMANGIOMA: ICD-10-CM

## 2020-01-24 PROBLEM — D23.5 OTHER BENIGN NEOPLASM OF SKIN OF TRUNK: Status: ACTIVE | Noted: 2020-01-24

## 2020-01-24 PROBLEM — D23.62 OTHER BENIGN NEOPLASM OF SKIN OF LEFT UPPER LIMB, INCLUDING SHOULDER: Status: ACTIVE | Noted: 2020-01-24

## 2020-01-24 PROBLEM — D18.01 HEMANGIOMA OF SKIN AND SUBCUTANEOUS TISSUE: Status: ACTIVE | Noted: 2020-01-24

## 2020-01-24 PROBLEM — D48.5 NEOPLASM OF UNCERTAIN BEHAVIOR OF SKIN: Status: ACTIVE | Noted: 2020-01-24

## 2020-01-24 PROBLEM — D22.5 MELANOCYTIC NEVI OF TRUNK: Status: ACTIVE | Noted: 2020-01-24

## 2020-01-24 PROCEDURE — ? PHOTO-DOCUMENTATION

## 2020-01-24 PROCEDURE — ? COUNSELING

## 2020-01-24 PROCEDURE — 99203 OFFICE O/P NEW LOW 30 MIN: CPT

## 2020-01-24 PROCEDURE — ? OBSERVATION

## 2020-01-24 ASSESSMENT — LOCATION DETAILED DESCRIPTION DERM
LOCATION DETAILED: RIGHT ANTERIOR DISTAL THIGH
LOCATION DETAILED: RIGHT LATERAL INFERIOR CHEST
LOCATION DETAILED: LEFT ANTERIOR DISTAL THIGH
LOCATION DETAILED: RIGHT DISTAL DORSAL FOREARM
LOCATION DETAILED: RIGHT INFERIOR MEDIAL MIDBACK
LOCATION DETAILED: RIGHT INFERIOR UPPER BACK
LOCATION DETAILED: LEFT SUPERIOR UPPER BACK
LOCATION DETAILED: LEFT DISTAL DORSAL FOREARM
LOCATION DETAILED: LEFT INFERIOR MEDIAL UPPER BACK
LOCATION DETAILED: RIGHT MID-UPPER BACK
LOCATION DETAILED: LEFT ANTERIOR PROXIMAL UPPER ARM
LOCATION DETAILED: RIGHT CENTRAL MALAR CHEEK
LOCATION DETAILED: LEFT MEDIAL SUPERIOR CHEST
LOCATION DETAILED: RIGHT SUPERIOR MEDIAL UPPER BACK
LOCATION DETAILED: RIGHT ANTERIOR PROXIMAL UPPER ARM
LOCATION DETAILED: LEFT INFERIOR CENTRAL MALAR CHEEK

## 2020-01-24 ASSESSMENT — LOCATION ZONE DERM
LOCATION ZONE: FACE
LOCATION ZONE: LEG
LOCATION ZONE: ARM
LOCATION ZONE: TRUNK

## 2020-01-24 ASSESSMENT — LOCATION SIMPLE DESCRIPTION DERM
LOCATION SIMPLE: RIGHT LOWER BACK
LOCATION SIMPLE: LEFT THIGH
LOCATION SIMPLE: LEFT UPPER BACK
LOCATION SIMPLE: RIGHT CHEEK
LOCATION SIMPLE: RIGHT THIGH
LOCATION SIMPLE: CHEST
LOCATION SIMPLE: RIGHT UPPER BACK
LOCATION SIMPLE: RIGHT UPPER ARM
LOCATION SIMPLE: LEFT UPPER ARM
LOCATION SIMPLE: LEFT FOREARM
LOCATION SIMPLE: LEFT CHEEK
LOCATION SIMPLE: RIGHT FOREARM

## 2020-01-24 NOTE — PROCEDURE: OBSERVATION
X Size Of Lesion In Cm (Optional): 0
Detail Level: Detailed
Size Of Lesion In Cm (Optional): 0.5
Size Of Lesion In Cm (Optional): 0.4
X Size Of Lesion In Cm (Optional): 0.7

## 2020-05-20 ENCOUNTER — HOSPITAL ENCOUNTER (OUTPATIENT)
Dept: LAB | Facility: MEDICAL CENTER | Age: 40
End: 2020-05-20
Attending: OPHTHALMOLOGY
Payer: COMMERCIAL

## 2020-05-20 LAB
PROLACTIN SERPL-MCNC: 72.5 NG/ML (ref 2.1–17.7)
T4 FREE SERPL-MCNC: 1.38 NG/DL (ref 0.93–1.7)
TESTOST SERPL-MCNC: 239 NG/DL (ref 175–781)

## 2020-05-20 PROCEDURE — 84439 ASSAY OF FREE THYROXINE: CPT

## 2020-05-20 PROCEDURE — 84146 ASSAY OF PROLACTIN: CPT

## 2020-05-20 PROCEDURE — 36415 COLL VENOUS BLD VENIPUNCTURE: CPT

## 2020-05-20 PROCEDURE — 84403 ASSAY OF TOTAL TESTOSTERONE: CPT

## 2020-05-20 PROCEDURE — 86769 SARS-COV-2 COVID-19 ANTIBODY: CPT

## 2020-05-22 LAB — SARS-COV-2 IGG SERPLBLD QL IA.RAPID: NEGATIVE

## 2020-08-06 ENCOUNTER — APPOINTMENT (RX ONLY)
Dept: URBAN - METROPOLITAN AREA CLINIC 4 | Facility: CLINIC | Age: 40
Setting detail: DERMATOLOGY
End: 2020-08-06

## 2020-08-06 DIAGNOSIS — L30.9 DERMATITIS, UNSPECIFIED: ICD-10-CM

## 2020-08-06 DIAGNOSIS — D22 MELANOCYTIC NEVI: ICD-10-CM

## 2020-08-06 PROBLEM — D22.5 MELANOCYTIC NEVI OF TRUNK: Status: ACTIVE | Noted: 2020-08-06

## 2020-08-06 PROBLEM — D22.62 MELANOCYTIC NEVI OF LEFT UPPER LIMB, INCLUDING SHOULDER: Status: ACTIVE | Noted: 2020-08-06

## 2020-08-06 PROBLEM — D22.61 MELANOCYTIC NEVI OF RIGHT UPPER LIMB, INCLUDING SHOULDER: Status: ACTIVE | Noted: 2020-08-06

## 2020-08-06 PROCEDURE — ? COUNSELING: TOPICAL STEROIDS

## 2020-08-06 PROCEDURE — 99213 OFFICE O/P EST LOW 20 MIN: CPT

## 2020-08-06 PROCEDURE — ? PRESCRIPTION

## 2020-08-06 PROCEDURE — ? COUNSELING

## 2020-08-06 PROCEDURE — ? ADDITIONAL NOTES

## 2020-08-06 PROCEDURE — ? PHOTO-DOCUMENTATION

## 2020-08-06 RX ORDER — CLOBETASOL PROPIONATE 0.5 MG/G
1 OINTMENT TOPICAL BID
Qty: 1 | Refills: 3 | Status: ERX | COMMUNITY
Start: 2020-08-06

## 2020-08-06 RX ADMIN — CLOBETASOL PROPIONATE 1: 0.5 OINTMENT TOPICAL at 00:00

## 2020-08-06 ASSESSMENT — LOCATION SIMPLE DESCRIPTION DERM
LOCATION SIMPLE: LEFT UPPER ARM
LOCATION SIMPLE: LEFT KNEE
LOCATION SIMPLE: RIGHT UPPER ARM
LOCATION SIMPLE: RIGHT UPPER BACK
LOCATION SIMPLE: CHEST

## 2020-08-06 ASSESSMENT — LOCATION ZONE DERM
LOCATION ZONE: LEG
LOCATION ZONE: ARM
LOCATION ZONE: TRUNK

## 2020-08-06 ASSESSMENT — LOCATION DETAILED DESCRIPTION DERM
LOCATION DETAILED: LEFT ANTERIOR DISTAL UPPER ARM
LOCATION DETAILED: LEFT KNEE
LOCATION DETAILED: RIGHT MEDIAL UPPER BACK
LOCATION DETAILED: RIGHT MEDIAL INFERIOR CHEST
LOCATION DETAILED: RIGHT ANTERIOR PROXIMAL UPPER ARM

## 2020-08-06 NOTE — PROCEDURE: ADDITIONAL NOTES
Detail Level: Simple
Additional Notes: Discussed patient trying different topical steroid ointment. \\nOffered biopsy but not recommended. \\nWill prescribe Clobetasol and send Rx to pharmacy. \\nExplained in detail how to apply topical steroid cream bid. \\nConsider punch biopsy next visit if no improvement with topical ointment. \\nWill have one of our dermatopathologist come in next visit.\\nPatient will call to schedule and will fit into schedule. Okay per Dr. Hanley.

## 2020-09-25 ENCOUNTER — HOSPITAL ENCOUNTER (OUTPATIENT)
Dept: LAB | Facility: MEDICAL CENTER | Age: 40
End: 2020-09-25
Attending: NURSE PRACTITIONER
Payer: COMMERCIAL

## 2020-09-25 LAB — PROLACTIN SERPL-MCNC: 52.6 NG/ML (ref 2.1–17.7)

## 2020-09-25 PROCEDURE — 36415 COLL VENOUS BLD VENIPUNCTURE: CPT

## 2020-09-25 PROCEDURE — 84146 ASSAY OF PROLACTIN: CPT

## 2020-12-03 ENCOUNTER — HOSPITAL ENCOUNTER (OUTPATIENT)
Dept: LAB | Facility: MEDICAL CENTER | Age: 40
End: 2020-12-03
Attending: OPHTHALMOLOGY
Payer: COMMERCIAL

## 2020-12-03 LAB
COVID ORDER STATUS COVID19: NORMAL
SARS-COV-2 RNA RESP QL NAA+PROBE: NOTDETECTED
SPECIMEN SOURCE: NORMAL

## 2020-12-03 PROCEDURE — U0003 INFECTIOUS AGENT DETECTION BY NUCLEIC ACID (DNA OR RNA); SEVERE ACUTE RESPIRATORY SYNDROME CORONAVIRUS 2 (SARS-COV-2) (CORONAVIRUS DISEASE [COVID-19]), AMPLIFIED PROBE TECHNIQUE, MAKING USE OF HIGH THROUGHPUT TECHNOLOGIES AS DESCRIBED BY CMS-2020-01-R: HCPCS

## 2020-12-19 DIAGNOSIS — Z23 NEED FOR VACCINATION: ICD-10-CM

## 2020-12-19 PROCEDURE — 0001A PFIZER SARS-COV-2 VACCINE: CPT | Performed by: FAMILY MEDICINE

## 2020-12-19 PROCEDURE — 91300 PFIZER SARS-COV-2 VACCINE: CPT | Performed by: FAMILY MEDICINE

## 2020-12-20 ENCOUNTER — IMMUNIZATION (OUTPATIENT)
Dept: FAMILY PLANNING/WOMEN'S HEALTH CLINIC | Facility: IMMUNIZATION CENTER | Age: 40
End: 2020-12-20
Payer: COMMERCIAL

## 2020-12-20 DIAGNOSIS — Z23 ENCOUNTER FOR VACCINATION: Primary | ICD-10-CM

## 2021-01-08 ENCOUNTER — IMMUNIZATION (OUTPATIENT)
Dept: FAMILY PLANNING/WOMEN'S HEALTH CLINIC | Facility: IMMUNIZATION CENTER | Age: 41
End: 2021-01-08
Attending: FAMILY MEDICINE
Payer: COMMERCIAL

## 2021-01-08 DIAGNOSIS — Z23 ENCOUNTER FOR VACCINATION: Primary | ICD-10-CM

## 2021-01-08 PROCEDURE — 0002A PFIZER SARS-COV-2 VACCINE: CPT

## 2021-01-08 PROCEDURE — 91300 PFIZER SARS-COV-2 VACCINE: CPT

## 2021-02-18 ENCOUNTER — HOSPITAL ENCOUNTER (OUTPATIENT)
Dept: LAB | Facility: MEDICAL CENTER | Age: 41
End: 2021-02-18
Attending: NURSE PRACTITIONER
Payer: COMMERCIAL

## 2021-02-18 LAB — PROLACTIN SERPL-MCNC: 75.4 NG/ML (ref 2.1–17.7)

## 2021-02-18 PROCEDURE — 36415 COLL VENOUS BLD VENIPUNCTURE: CPT

## 2021-02-18 PROCEDURE — 84146 ASSAY OF PROLACTIN: CPT

## 2021-04-30 ENCOUNTER — HOSPITAL ENCOUNTER (OUTPATIENT)
Dept: RADIOLOGY | Facility: MEDICAL CENTER | Age: 41
End: 2021-04-30
Attending: NURSE PRACTITIONER
Payer: COMMERCIAL

## 2021-04-30 ENCOUNTER — HOSPITAL ENCOUNTER (OUTPATIENT)
Dept: RADIOLOGY | Facility: MEDICAL CENTER | Age: 41
End: 2021-04-30
Attending: FAMILY MEDICINE
Payer: COMMERCIAL

## 2021-04-30 DIAGNOSIS — D49.7 HYPOPITUITARISM DUE TO PITUITARY TUMOR (HCC): ICD-10-CM

## 2021-04-30 DIAGNOSIS — D35.2 PITUITARY ADENOMA (HCC): ICD-10-CM

## 2021-04-30 DIAGNOSIS — E23.0 HYPOPITUITARISM DUE TO PITUITARY TUMOR (HCC): ICD-10-CM

## 2021-04-30 DIAGNOSIS — M54.50 LOW BACK PAIN, UNSPECIFIED BACK PAIN LATERALITY, UNSPECIFIED CHRONICITY, UNSPECIFIED WHETHER SCIATICA PRESENT: ICD-10-CM

## 2021-04-30 PROCEDURE — 700117 HCHG RX CONTRAST REV CODE 255: Performed by: NURSE PRACTITIONER

## 2021-04-30 PROCEDURE — 72100 X-RAY EXAM L-S SPINE 2/3 VWS: CPT

## 2021-04-30 PROCEDURE — 70553 MRI BRAIN STEM W/O & W/DYE: CPT

## 2021-04-30 PROCEDURE — 72072 X-RAY EXAM THORAC SPINE 3VWS: CPT

## 2021-04-30 PROCEDURE — A9576 INJ PROHANCE MULTIPACK: HCPCS | Performed by: NURSE PRACTITIONER

## 2021-04-30 RX ADMIN — GADOTERIDOL 15 ML: 279.3 INJECTION, SOLUTION INTRAVENOUS at 13:32

## 2021-05-15 ENCOUNTER — HOSPITAL ENCOUNTER (OUTPATIENT)
Dept: LAB | Facility: MEDICAL CENTER | Age: 41
End: 2021-05-15
Attending: OPTOMETRIST
Payer: COMMERCIAL

## 2021-05-15 ENCOUNTER — HOSPITAL ENCOUNTER (OUTPATIENT)
Dept: LAB | Facility: MEDICAL CENTER | Age: 41
End: 2021-05-15
Attending: FAMILY MEDICINE
Payer: COMMERCIAL

## 2021-05-15 LAB
ALBUMIN SERPL BCP-MCNC: 4.7 G/DL (ref 3.2–4.9)
ALBUMIN/GLOB SERPL: 2 G/DL
ALP SERPL-CCNC: 68 U/L (ref 30–99)
ALT SERPL-CCNC: 15 U/L (ref 2–50)
ANION GAP SERPL CALC-SCNC: 9 MMOL/L (ref 7–16)
AST SERPL-CCNC: 20 U/L (ref 12–45)
BASOPHILS # BLD AUTO: 0.9 % (ref 0–1.8)
BASOPHILS # BLD: 0.04 K/UL (ref 0–0.12)
BILIRUB SERPL-MCNC: 1.2 MG/DL (ref 0.1–1.5)
BUN SERPL-MCNC: 19 MG/DL (ref 8–22)
CALCIUM SERPL-MCNC: 9.6 MG/DL (ref 8.5–10.5)
CHLORIDE SERPL-SCNC: 106 MMOL/L (ref 96–112)
CHOLEST SERPL-MCNC: 226 MG/DL (ref 100–199)
CO2 SERPL-SCNC: 28 MMOL/L (ref 20–33)
CREAT SERPL-MCNC: 1.12 MG/DL (ref 0.5–1.4)
EOSINOPHIL # BLD AUTO: 0.07 K/UL (ref 0–0.51)
EOSINOPHIL NFR BLD: 1.5 % (ref 0–6.9)
ERYTHROCYTE [DISTWIDTH] IN BLOOD BY AUTOMATED COUNT: 40.9 FL (ref 35.9–50)
FASTING STATUS PATIENT QL REPORTED: NORMAL
GLOBULIN SER CALC-MCNC: 2.4 G/DL (ref 1.9–3.5)
GLUCOSE SERPL-MCNC: 89 MG/DL (ref 65–99)
HCT VFR BLD AUTO: 46.3 % (ref 42–52)
HDLC SERPL-MCNC: 56 MG/DL
HGB BLD-MCNC: 15.9 G/DL (ref 14–18)
IMM GRANULOCYTES # BLD AUTO: 0.01 K/UL (ref 0–0.11)
IMM GRANULOCYTES NFR BLD AUTO: 0.2 % (ref 0–0.9)
LDLC SERPL CALC-MCNC: 152 MG/DL
LYMPHOCYTES # BLD AUTO: 1.75 K/UL (ref 1–4.8)
LYMPHOCYTES NFR BLD: 38 % (ref 22–41)
MCH RBC QN AUTO: 31.5 PG (ref 27–33)
MCHC RBC AUTO-ENTMCNC: 34.3 G/DL (ref 33.7–35.3)
MCV RBC AUTO: 91.9 FL (ref 81.4–97.8)
MONOCYTES # BLD AUTO: 0.31 K/UL (ref 0–0.85)
MONOCYTES NFR BLD AUTO: 6.7 % (ref 0–13.4)
NEUTROPHILS # BLD AUTO: 2.43 K/UL (ref 1.82–7.42)
NEUTROPHILS NFR BLD: 52.7 % (ref 44–72)
NRBC # BLD AUTO: 0 K/UL
NRBC BLD-RTO: 0 /100 WBC
PLATELET # BLD AUTO: 158 K/UL (ref 164–446)
PMV BLD AUTO: 10.7 FL (ref 9–12.9)
POTASSIUM SERPL-SCNC: 4.1 MMOL/L (ref 3.6–5.5)
PROT SERPL-MCNC: 7.1 G/DL (ref 6–8.2)
RBC # BLD AUTO: 5.04 M/UL (ref 4.7–6.1)
SARS-COV-2 AB SERPL QL IA: NORMAL
SODIUM SERPL-SCNC: 143 MMOL/L (ref 135–145)
T4 FREE SERPL-MCNC: 1.36 NG/DL (ref 0.93–1.7)
TRIGL SERPL-MCNC: 91 MG/DL (ref 0–149)
WBC # BLD AUTO: 4.6 K/UL (ref 4.8–10.8)

## 2021-05-15 PROCEDURE — 80061 LIPID PANEL: CPT

## 2021-05-15 PROCEDURE — 85025 COMPLETE CBC W/AUTO DIFF WBC: CPT

## 2021-05-15 PROCEDURE — 84403 ASSAY OF TOTAL TESTOSTERONE: CPT

## 2021-05-15 PROCEDURE — 36415 COLL VENOUS BLD VENIPUNCTURE: CPT

## 2021-05-15 PROCEDURE — 84439 ASSAY OF FREE THYROXINE: CPT

## 2021-05-15 PROCEDURE — 80053 COMPREHEN METABOLIC PANEL: CPT

## 2021-05-15 PROCEDURE — 86769 SARS-COV-2 COVID-19 ANTIBODY: CPT

## 2021-05-17 LAB — TESTOST SERPL-MCNC: 315 NG/DL (ref 300–890)

## 2021-06-21 ENCOUNTER — HOSPITAL ENCOUNTER (OUTPATIENT)
Dept: LAB | Facility: MEDICAL CENTER | Age: 41
End: 2021-06-21
Attending: FAMILY MEDICINE
Payer: COMMERCIAL

## 2021-06-21 LAB
25(OH)D3 SERPL-MCNC: 34 NG/ML (ref 30–100)
CK SERPL-CCNC: 142 U/L (ref 0–154)
CRP SERPL HS-MCNC: <0.3 MG/DL (ref 0–0.75)
ERYTHROCYTE [SEDIMENTATION RATE] IN BLOOD BY WESTERGREN METHOD: 5 MM/HOUR (ref 0–20)
PTH-INTACT SERPL-MCNC: 26.9 PG/ML (ref 14–72)

## 2021-06-21 PROCEDURE — 84270 ASSAY OF SEX HORMONE GLOBUL: CPT

## 2021-06-21 PROCEDURE — 85652 RBC SED RATE AUTOMATED: CPT

## 2021-06-21 PROCEDURE — 83970 ASSAY OF PARATHORMONE: CPT

## 2021-06-21 PROCEDURE — 84402 ASSAY OF FREE TESTOSTERONE: CPT

## 2021-06-21 PROCEDURE — 82550 ASSAY OF CK (CPK): CPT

## 2021-06-21 PROCEDURE — 82306 VITAMIN D 25 HYDROXY: CPT

## 2021-06-21 PROCEDURE — 86140 C-REACTIVE PROTEIN: CPT

## 2021-06-21 PROCEDURE — 86812 HLA TYPING A B OR C: CPT

## 2021-06-21 PROCEDURE — 84403 ASSAY OF TOTAL TESTOSTERONE: CPT

## 2021-06-21 PROCEDURE — 36415 COLL VENOUS BLD VENIPUNCTURE: CPT

## 2021-06-22 LAB
SHBG SERPL-SCNC: 25 NMOL/L (ref 11–80)
TESTOST FREE MFR SERPL: 2.1 % (ref 1.6–2.9)
TESTOST FREE SERPL-MCNC: 59 PG/ML (ref 47–244)
TESTOST SERPL-MCNC: 289 NG/DL (ref 300–890)

## 2021-06-23 LAB — HLA-B27 QL FC: POSITIVE

## 2021-07-23 ENCOUNTER — HOSPITAL ENCOUNTER (OUTPATIENT)
Dept: RADIOLOGY | Facility: MEDICAL CENTER | Age: 41
End: 2021-07-23
Attending: INTERNAL MEDICINE
Payer: COMMERCIAL

## 2021-07-23 DIAGNOSIS — M54.50 LOW BACK PAIN, UNSPECIFIED BACK PAIN LATERALITY, UNSPECIFIED CHRONICITY, UNSPECIFIED WHETHER SCIATICA PRESENT: ICD-10-CM

## 2021-07-23 DIAGNOSIS — M54.2 CERVICALGIA: ICD-10-CM

## 2021-07-23 PROCEDURE — 72202 X-RAY EXAM SI JOINTS 3/> VWS: CPT

## 2021-07-23 PROCEDURE — 72050 X-RAY EXAM NECK SPINE 4/5VWS: CPT

## 2021-08-27 ENCOUNTER — HOSPITAL ENCOUNTER (OUTPATIENT)
Dept: RADIOLOGY | Facility: MEDICAL CENTER | Age: 41
End: 2021-08-27
Attending: INTERNAL MEDICINE
Payer: COMMERCIAL

## 2021-08-27 DIAGNOSIS — M54.6 PAIN IN THORACIC SPINE: ICD-10-CM

## 2021-08-27 DIAGNOSIS — M54.50 LOW BACK PAIN, UNSPECIFIED BACK PAIN LATERALITY, UNSPECIFIED CHRONICITY, UNSPECIFIED WHETHER SCIATICA PRESENT: ICD-10-CM

## 2021-08-27 DIAGNOSIS — M54.2 CERVICALGIA: ICD-10-CM

## 2021-08-27 PROCEDURE — 72146 MRI CHEST SPINE W/O DYE: CPT

## 2021-08-27 PROCEDURE — 72195 MRI PELVIS W/O DYE: CPT

## 2021-08-27 PROCEDURE — 72141 MRI NECK SPINE W/O DYE: CPT

## 2021-08-27 PROCEDURE — 72148 MRI LUMBAR SPINE W/O DYE: CPT

## 2021-10-02 ENCOUNTER — HOSPITAL ENCOUNTER (OUTPATIENT)
Dept: LAB | Facility: MEDICAL CENTER | Age: 41
End: 2021-10-02
Attending: OPHTHALMOLOGY
Payer: COMMERCIAL

## 2021-10-02 PROCEDURE — 36415 COLL VENOUS BLD VENIPUNCTURE: CPT

## 2021-10-02 PROCEDURE — 86769 SARS-COV-2 COVID-19 ANTIBODY: CPT

## 2021-10-04 LAB
SARS-COV-2 IGG SERPL IA-ACNC: 2.82 IV
SARS-COV-2 IGG SERPL QL IA: POSITIVE

## 2022-03-11 ENCOUNTER — APPOINTMENT (RX ONLY)
Dept: URBAN - METROPOLITAN AREA CLINIC 4 | Facility: CLINIC | Age: 42
Setting detail: DERMATOLOGY
End: 2022-03-11

## 2022-03-11 DIAGNOSIS — K60 FISSURE AND FISTULA OF ANAL AND RECTAL REGIONS: ICD-10-CM

## 2022-03-11 DIAGNOSIS — D22 MELANOCYTIC NEVI: ICD-10-CM

## 2022-03-11 DIAGNOSIS — L81.4 OTHER MELANIN HYPERPIGMENTATION: ICD-10-CM

## 2022-03-11 DIAGNOSIS — L82.1 OTHER SEBORRHEIC KERATOSIS: ICD-10-CM

## 2022-03-11 DIAGNOSIS — L73.8 OTHER SPECIFIED FOLLICULAR DISORDERS: ICD-10-CM

## 2022-03-11 DIAGNOSIS — D18.0 HEMANGIOMA: ICD-10-CM

## 2022-03-11 DIAGNOSIS — L57.8 OTHER SKIN CHANGES DUE TO CHRONIC EXPOSURE TO NONIONIZING RADIATION: ICD-10-CM

## 2022-03-11 PROBLEM — K60.0 ACUTE ANAL FISSURE: Status: ACTIVE | Noted: 2022-03-11

## 2022-03-11 PROBLEM — D23.62 OTHER BENIGN NEOPLASM OF SKIN OF LEFT UPPER LIMB, INCLUDING SHOULDER: Status: ACTIVE | Noted: 2022-03-11

## 2022-03-11 PROBLEM — D22.5 MELANOCYTIC NEVI OF TRUNK: Status: ACTIVE | Noted: 2022-03-11

## 2022-03-11 PROBLEM — D18.01 HEMANGIOMA OF SKIN AND SUBCUTANEOUS TISSUE: Status: ACTIVE | Noted: 2022-03-11

## 2022-03-11 PROCEDURE — 99213 OFFICE O/P EST LOW 20 MIN: CPT

## 2022-03-11 PROCEDURE — ? PRESCRIPTION

## 2022-03-11 PROCEDURE — ? COUNSELING

## 2022-03-11 RX ORDER — MUPIROCIN 20 MG/G
1 OINTMENT TOPICAL BID
Qty: 2 | Refills: 5 | Status: ERX | COMMUNITY
Start: 2022-03-11

## 2022-03-11 RX ADMIN — MUPIROCIN 1: 20 OINTMENT TOPICAL at 00:00

## 2022-03-11 ASSESSMENT — LOCATION DETAILED DESCRIPTION DERM
LOCATION DETAILED: RIGHT MID-UPPER BACK
LOCATION DETAILED: LEFT MEDIAL SUPERIOR CHEST
LOCATION DETAILED: RIGHT CENTRAL MALAR CHEEK
LOCATION DETAILED: RIGHT DISTAL DORSAL FOREARM
LOCATION DETAILED: RIGHT ANTERIOR DISTAL THIGH
LOCATION DETAILED: LEFT ANTERIOR PROXIMAL UPPER ARM
LOCATION DETAILED: RIGHT SUPERIOR MEDIAL UPPER BACK
LOCATION DETAILED: RIGHT ANTERIOR PROXIMAL UPPER ARM
LOCATION DETAILED: LEFT SUPERIOR MEDIAL MALAR CHEEK
LOCATION DETAILED: LEFT ANTERIOR DISTAL THIGH
LOCATION DETAILED: RIGHT INFERIOR UPPER BACK
LOCATION DETAILED: LEFT INFERIOR LATERAL FOREHEAD
LOCATION DETAILED: LEFT INFERIOR CENTRAL MALAR CHEEK
LOCATION DETAILED: LEFT DISTAL DORSAL FOREARM

## 2022-03-11 ASSESSMENT — LOCATION SIMPLE DESCRIPTION DERM
LOCATION SIMPLE: LEFT FOREHEAD
LOCATION SIMPLE: LEFT UPPER ARM
LOCATION SIMPLE: RIGHT CHEEK
LOCATION SIMPLE: RIGHT UPPER BACK
LOCATION SIMPLE: LEFT THIGH
LOCATION SIMPLE: LEFT FOREARM
LOCATION SIMPLE: RIGHT UPPER ARM
LOCATION SIMPLE: LEFT CHEEK
LOCATION SIMPLE: RIGHT THIGH
LOCATION SIMPLE: RIGHT FOREARM
LOCATION SIMPLE: CHEST

## 2022-03-11 ASSESSMENT — LOCATION ZONE DERM
LOCATION ZONE: LEG
LOCATION ZONE: FACE
LOCATION ZONE: ARM
LOCATION ZONE: TRUNK

## 2022-10-19 ENCOUNTER — HOSPITAL ENCOUNTER (OUTPATIENT)
Dept: LAB | Facility: MEDICAL CENTER | Age: 42
End: 2022-10-19
Attending: NURSE PRACTITIONER
Payer: COMMERCIAL

## 2022-10-19 LAB
BASOPHILS # BLD AUTO: 1.2 % (ref 0–1.8)
BASOPHILS # BLD: 0.06 K/UL (ref 0–0.12)
EOSINOPHIL # BLD AUTO: 0.08 K/UL (ref 0–0.51)
EOSINOPHIL NFR BLD: 1.6 % (ref 0–6.9)
ERYTHROCYTE [DISTWIDTH] IN BLOOD BY AUTOMATED COUNT: 45.1 FL (ref 35.9–50)
HCT VFR BLD AUTO: 41.9 % (ref 42–52)
HGB BLD-MCNC: 13.9 G/DL (ref 14–18)
IMM GRANULOCYTES # BLD AUTO: 0.02 K/UL (ref 0–0.11)
IMM GRANULOCYTES NFR BLD AUTO: 0.4 % (ref 0–0.9)
LYMPHOCYTES # BLD AUTO: 1.8 K/UL (ref 1–4.8)
LYMPHOCYTES NFR BLD: 36.4 % (ref 22–41)
MCH RBC QN AUTO: 30.9 PG (ref 27–33)
MCHC RBC AUTO-ENTMCNC: 33.2 G/DL (ref 33.7–35.3)
MCV RBC AUTO: 93.1 FL (ref 81.4–97.8)
MONOCYTES # BLD AUTO: 0.35 K/UL (ref 0–0.85)
MONOCYTES NFR BLD AUTO: 7.1 % (ref 0–13.4)
NEUTROPHILS # BLD AUTO: 2.64 K/UL (ref 1.82–7.42)
NEUTROPHILS NFR BLD: 53.3 % (ref 44–72)
NRBC # BLD AUTO: 0 K/UL
NRBC BLD-RTO: 0 /100 WBC
PLATELET # BLD AUTO: 155 K/UL (ref 164–446)
PMV BLD AUTO: 10.3 FL (ref 9–12.9)
PROLACTIN SERPL-MCNC: 50 NG/ML (ref 2.1–17.7)
RBC # BLD AUTO: 4.5 M/UL (ref 4.7–6.1)
T4 FREE SERPL-MCNC: 1.4 NG/DL (ref 0.93–1.7)
TESTOST SERPL-MCNC: 295 NG/DL (ref 175–781)
WBC # BLD AUTO: 5 K/UL (ref 4.8–10.8)

## 2022-10-19 PROCEDURE — 84403 ASSAY OF TOTAL TESTOSTERONE: CPT

## 2022-10-19 PROCEDURE — 85025 COMPLETE CBC W/AUTO DIFF WBC: CPT

## 2022-10-19 PROCEDURE — 84146 ASSAY OF PROLACTIN: CPT

## 2022-10-19 PROCEDURE — 36415 COLL VENOUS BLD VENIPUNCTURE: CPT

## 2022-10-19 PROCEDURE — 84439 ASSAY OF FREE THYROXINE: CPT

## 2022-11-29 ENCOUNTER — HOSPITAL ENCOUNTER (OUTPATIENT)
Dept: LAB | Facility: MEDICAL CENTER | Age: 42
End: 2022-11-29
Attending: INTERNAL MEDICINE
Payer: COMMERCIAL

## 2022-11-29 LAB — TESTOST SERPL-MCNC: 248 NG/DL (ref 175–781)

## 2022-11-29 PROCEDURE — 84403 ASSAY OF TOTAL TESTOSTERONE: CPT

## 2022-11-29 PROCEDURE — 36415 COLL VENOUS BLD VENIPUNCTURE: CPT

## 2022-12-13 ENCOUNTER — HOSPITAL ENCOUNTER (OUTPATIENT)
Dept: LAB | Facility: MEDICAL CENTER | Age: 42
End: 2022-12-13
Attending: NURSE PRACTITIONER
Payer: COMMERCIAL

## 2022-12-13 LAB
PROLACTIN SERPL-MCNC: 53.7 NG/ML (ref 2.1–17.7)
TESTOST SERPL-MCNC: 400 NG/DL (ref 175–781)

## 2022-12-13 PROCEDURE — 36415 COLL VENOUS BLD VENIPUNCTURE: CPT

## 2022-12-13 PROCEDURE — 84146 ASSAY OF PROLACTIN: CPT

## 2022-12-13 PROCEDURE — 84403 ASSAY OF TOTAL TESTOSTERONE: CPT

## 2023-01-20 ENCOUNTER — HOSPITAL ENCOUNTER (OUTPATIENT)
Dept: LAB | Facility: MEDICAL CENTER | Age: 43
End: 2023-01-20
Attending: OPTOMETRIST
Payer: COMMERCIAL

## 2023-01-20 LAB — PROLACTIN SERPL-MCNC: 61.2 NG/ML (ref 2.1–17.7)

## 2023-01-20 PROCEDURE — 36415 COLL VENOUS BLD VENIPUNCTURE: CPT

## 2023-01-20 PROCEDURE — 84146 ASSAY OF PROLACTIN: CPT

## 2023-02-10 ENCOUNTER — HOSPITAL ENCOUNTER (OUTPATIENT)
Dept: RADIOLOGY | Facility: MEDICAL CENTER | Age: 43
End: 2023-02-10
Attending: NURSE PRACTITIONER
Payer: COMMERCIAL

## 2023-02-10 DIAGNOSIS — D35.2 PROLACTINOMA (HCC): ICD-10-CM

## 2023-02-10 PROCEDURE — A9579 GAD-BASE MR CONTRAST NOS,1ML: HCPCS | Performed by: NURSE PRACTITIONER

## 2023-02-10 PROCEDURE — 700117 HCHG RX CONTRAST REV CODE 255: Performed by: NURSE PRACTITIONER

## 2023-02-10 PROCEDURE — 70553 MRI BRAIN STEM W/O & W/DYE: CPT

## 2023-02-10 RX ADMIN — GADOTERIDOL 15 ML: 279.3 INJECTION, SOLUTION INTRAVENOUS at 08:15

## 2023-06-16 ENCOUNTER — HOSPITAL ENCOUNTER (OUTPATIENT)
Dept: LAB | Facility: MEDICAL CENTER | Age: 43
End: 2023-06-16
Attending: NURSE PRACTITIONER
Payer: COMMERCIAL

## 2023-06-16 LAB — PROLACTIN SERPL-MCNC: 71.8 NG/ML (ref 2.1–17.7)

## 2023-06-16 PROCEDURE — 84146 ASSAY OF PROLACTIN: CPT

## 2023-06-16 PROCEDURE — 36415 COLL VENOUS BLD VENIPUNCTURE: CPT

## 2023-06-23 NOTE — PROCEDURE: MIPS QUALITY
Quality 130: Documentation Of Current Medications In The Medical Record: Current Medications Documented
Detail Level: Detailed
Quality 226: Preventive Care And Screening: Tobacco Use: Screening And Cessation Intervention: Patient screened for tobacco use and is an ex/non-smoker
chest pain x 1 month was intermittent now constant. no stents

## 2023-07-12 ENCOUNTER — HOSPITAL ENCOUNTER (OUTPATIENT)
Facility: MEDICAL CENTER | Age: 43
End: 2023-07-12
Attending: UROLOGY
Payer: COMMERCIAL

## 2023-07-12 LAB
HCT VFR BLD AUTO: 46.2 % (ref 42–52)
HGB BLD-MCNC: 15.6 G/DL (ref 14–18)

## 2023-07-12 PROCEDURE — 84403 ASSAY OF TOTAL TESTOSTERONE: CPT

## 2023-07-12 PROCEDURE — 85014 HEMATOCRIT: CPT

## 2023-07-12 PROCEDURE — 84270 ASSAY OF SEX HORMONE GLOBUL: CPT

## 2023-07-12 PROCEDURE — 84402 ASSAY OF FREE TESTOSTERONE: CPT

## 2023-07-12 PROCEDURE — 85018 HEMOGLOBIN: CPT

## 2023-07-14 LAB
SHBG SERPL-SCNC: 23 NMOL/L (ref 17–56)
TESTOST FREE MFR SERPL: 2.2 % (ref 1.6–2.9)
TESTOST FREE SERPL-MCNC: 94 PG/ML (ref 47–244)
TESTOST SERPL-MCNC: 426 NG/DL (ref 300–890)

## 2023-07-28 ENCOUNTER — APPOINTMENT (OUTPATIENT)
Dept: RADIOLOGY | Facility: MEDICAL CENTER | Age: 43
End: 2023-07-28
Attending: FAMILY MEDICINE
Payer: COMMERCIAL

## 2023-07-28 DIAGNOSIS — M51.9 INTERVERTEBRAL DISC DISORDER: ICD-10-CM

## 2023-07-28 PROCEDURE — 72148 MRI LUMBAR SPINE W/O DYE: CPT

## 2023-07-28 PROCEDURE — 72100 X-RAY EXAM L-S SPINE 2/3 VWS: CPT

## 2023-09-15 ENCOUNTER — HOSPITAL ENCOUNTER (OUTPATIENT)
Dept: LAB | Facility: MEDICAL CENTER | Age: 43
End: 2023-09-15
Attending: INTERNAL MEDICINE
Payer: COMMERCIAL

## 2023-09-15 LAB
25(OH)D3 SERPL-MCNC: 34 NG/ML (ref 30–100)
ERYTHROCYTE [DISTWIDTH] IN BLOOD BY AUTOMATED COUNT: 40.8 FL (ref 35.9–50)
FSH SERPL-ACNC: 0.3 MIU/ML (ref 1.5–12.4)
HCT VFR BLD AUTO: 46.4 % (ref 42–52)
HGB BLD-MCNC: 15.8 G/DL (ref 14–18)
LH SERPL-ACNC: <0.3 IU/L (ref 1.7–8.6)
MCH RBC QN AUTO: 30.7 PG (ref 27–33)
MCHC RBC AUTO-ENTMCNC: 34.1 G/DL (ref 32.3–36.5)
MCV RBC AUTO: 90.3 FL (ref 81.4–97.8)
PROLACTIN SERPL-MCNC: 52.7 NG/ML (ref 2.1–17.7)
RBC # BLD AUTO: 5.14 M/UL (ref 4.7–6.1)
T3FREE SERPL-MCNC: 2.35 PG/ML (ref 2–4.4)
T4 FREE SERPL-MCNC: 1.31 NG/DL (ref 0.93–1.7)
TESTOST SERPL-MCNC: 258 NG/DL (ref 175–781)
TSH SERPL DL<=0.005 MIU/L-ACNC: 0.46 UIU/ML (ref 0.38–5.33)
WBC # BLD AUTO: 5.2 K/UL (ref 4.8–10.8)

## 2023-09-15 PROCEDURE — 85018 HEMOGLOBIN: CPT

## 2023-09-15 PROCEDURE — 83001 ASSAY OF GONADOTROPIN (FSH): CPT

## 2023-09-15 PROCEDURE — 84270 ASSAY OF SEX HORMONE GLOBUL: CPT

## 2023-09-15 PROCEDURE — 85041 AUTOMATED RBC COUNT: CPT

## 2023-09-15 PROCEDURE — 85014 HEMATOCRIT: CPT

## 2023-09-15 PROCEDURE — 84403 ASSAY OF TOTAL TESTOSTERONE: CPT

## 2023-09-15 PROCEDURE — 84481 FREE ASSAY (FT-3): CPT

## 2023-09-15 PROCEDURE — 85048 AUTOMATED LEUKOCYTE COUNT: CPT

## 2023-09-15 PROCEDURE — 36415 COLL VENOUS BLD VENIPUNCTURE: CPT

## 2023-09-15 PROCEDURE — 84439 ASSAY OF FREE THYROXINE: CPT

## 2023-09-15 PROCEDURE — 82306 VITAMIN D 25 HYDROXY: CPT

## 2023-09-15 PROCEDURE — 83002 ASSAY OF GONADOTROPIN (LH): CPT

## 2023-09-15 PROCEDURE — 84443 ASSAY THYROID STIM HORMONE: CPT

## 2023-09-15 PROCEDURE — 84146 ASSAY OF PROLACTIN: CPT

## 2023-09-17 LAB — SHBG SERPL-SCNC: 24 NMOL/L (ref 17–56)

## 2023-10-17 ENCOUNTER — APPOINTMENT (RX ONLY)
Dept: URBAN - METROPOLITAN AREA CLINIC 4 | Facility: CLINIC | Age: 43
Setting detail: DERMATOLOGY
End: 2023-10-17

## 2023-10-17 DIAGNOSIS — L57.8 OTHER SKIN CHANGES DUE TO CHRONIC EXPOSURE TO NONIONIZING RADIATION: ICD-10-CM

## 2023-10-17 DIAGNOSIS — D22 MELANOCYTIC NEVI: ICD-10-CM

## 2023-10-17 DIAGNOSIS — D18.0 HEMANGIOMA: ICD-10-CM

## 2023-10-17 DIAGNOSIS — L82.1 OTHER SEBORRHEIC KERATOSIS: ICD-10-CM

## 2023-10-17 DIAGNOSIS — L81.4 OTHER MELANIN HYPERPIGMENTATION: ICD-10-CM

## 2023-10-17 DIAGNOSIS — L72.8 OTHER FOLLICULAR CYSTS OF THE SKIN AND SUBCUTANEOUS TISSUE: ICD-10-CM

## 2023-10-17 PROBLEM — D22.5 MELANOCYTIC NEVI OF TRUNK: Status: ACTIVE | Noted: 2023-10-17

## 2023-10-17 PROBLEM — D18.01 HEMANGIOMA OF SKIN AND SUBCUTANEOUS TISSUE: Status: ACTIVE | Noted: 2023-10-17

## 2023-10-17 PROCEDURE — ? COUNSELING

## 2023-10-17 PROCEDURE — 99213 OFFICE O/P EST LOW 20 MIN: CPT

## 2023-10-17 PROCEDURE — ? OBSERVATION

## 2023-10-17 PROCEDURE — ? DEFER

## 2023-10-17 ASSESSMENT — LOCATION SIMPLE DESCRIPTION DERM
LOCATION SIMPLE: RIGHT FOREARM
LOCATION SIMPLE: RIGHT CHEEK
LOCATION SIMPLE: CHEST
LOCATION SIMPLE: RIGHT THIGH
LOCATION SIMPLE: LEFT FOREARM
LOCATION SIMPLE: LEFT CHEEK
LOCATION SIMPLE: LEFT THIGH
LOCATION SIMPLE: LEFT UPPER ARM
LOCATION SIMPLE: POSTERIOR NECK
LOCATION SIMPLE: RIGHT UPPER BACK
LOCATION SIMPLE: RIGHT UPPER ARM

## 2023-10-17 ASSESSMENT — LOCATION DETAILED DESCRIPTION DERM
LOCATION DETAILED: RIGHT MID-UPPER BACK
LOCATION DETAILED: RIGHT SUPERIOR MEDIAL UPPER BACK
LOCATION DETAILED: RIGHT ANTERIOR DISTAL THIGH
LOCATION DETAILED: LEFT MEDIAL SUPERIOR CHEST
LOCATION DETAILED: RIGHT CENTRAL MALAR CHEEK
LOCATION DETAILED: RIGHT MEDIAL TRAPEZIAL NECK
LOCATION DETAILED: RIGHT DISTAL DORSAL FOREARM
LOCATION DETAILED: RIGHT ANTERIOR PROXIMAL UPPER ARM
LOCATION DETAILED: LEFT INFERIOR CENTRAL MALAR CHEEK
LOCATION DETAILED: LEFT DISTAL DORSAL FOREARM
LOCATION DETAILED: RIGHT INFERIOR UPPER BACK
LOCATION DETAILED: LEFT ANTERIOR DISTAL THIGH
LOCATION DETAILED: LEFT ANTERIOR PROXIMAL UPPER ARM

## 2023-10-17 ASSESSMENT — LOCATION ZONE DERM
LOCATION ZONE: TRUNK
LOCATION ZONE: LEG
LOCATION ZONE: FACE
LOCATION ZONE: NECK
LOCATION ZONE: ARM

## 2023-10-17 NOTE — PROCEDURE: DEFER
Introduction Text (Please End With A Colon): The following procedure was deferred:  Will set up surgery appt.  Biopsy was done at another facility  ([facility])
X Size Of Lesion In Cm (Optional): 0
Detail Level: Detailed

## 2023-11-17 ENCOUNTER — HOSPITAL ENCOUNTER (OUTPATIENT)
Dept: LAB | Facility: MEDICAL CENTER | Age: 43
End: 2023-11-17
Attending: INTERNAL MEDICINE
Payer: COMMERCIAL

## 2023-11-17 ENCOUNTER — HOSPITAL ENCOUNTER (OUTPATIENT)
Dept: LAB | Facility: MEDICAL CENTER | Age: 43
End: 2023-11-17
Attending: NURSE PRACTITIONER
Payer: COMMERCIAL

## 2023-11-17 LAB
HCT VFR BLD AUTO: 47.6 % (ref 42–52)
HGB BLD-MCNC: 16 G/DL (ref 14–18)
PROLACTIN SERPL-MCNC: 118 NG/ML (ref 2.1–17.7)
TESTOST SERPL-MCNC: 766 NG/DL (ref 175–781)

## 2023-11-17 PROCEDURE — 84270 ASSAY OF SEX HORMONE GLOBUL: CPT

## 2023-11-17 PROCEDURE — 84403 ASSAY OF TOTAL TESTOSTERONE: CPT

## 2023-11-17 PROCEDURE — 36415 COLL VENOUS BLD VENIPUNCTURE: CPT

## 2023-11-17 PROCEDURE — 85014 HEMATOCRIT: CPT

## 2023-11-17 PROCEDURE — 85018 HEMOGLOBIN: CPT

## 2023-11-17 PROCEDURE — 84146 ASSAY OF PROLACTIN: CPT

## 2023-11-18 LAB — SHBG SERPL-SCNC: 17 NMOL/L (ref 17–56)

## 2023-12-12 ENCOUNTER — APPOINTMENT (RX ONLY)
Dept: URBAN - METROPOLITAN AREA CLINIC 4 | Facility: CLINIC | Age: 43
Setting detail: DERMATOLOGY
End: 2023-12-12

## 2023-12-12 DIAGNOSIS — L72.8 OTHER FOLLICULAR CYSTS OF THE SKIN AND SUBCUTANEOUS TISSUE: ICD-10-CM

## 2023-12-12 PROBLEM — D48.5 NEOPLASM OF UNCERTAIN BEHAVIOR OF SKIN: Status: ACTIVE | Noted: 2023-12-12

## 2023-12-12 PROCEDURE — ? EXCISION

## 2023-12-12 PROCEDURE — 13131 CMPLX RPR F/C/C/M/N/AX/G/H/F: CPT

## 2023-12-12 PROCEDURE — 11423 EXC H-F-NK-SP B9+MARG 2.1-3: CPT

## 2023-12-12 ASSESSMENT — LOCATION SIMPLE DESCRIPTION DERM: LOCATION SIMPLE: POSTERIOR NECK

## 2023-12-12 ASSESSMENT — LOCATION DETAILED DESCRIPTION DERM: LOCATION DETAILED: RIGHT MEDIAL TRAPEZIAL NECK

## 2023-12-12 ASSESSMENT — LOCATION ZONE DERM: LOCATION ZONE: NECK

## 2023-12-12 NOTE — PROCEDURE: EXCISION
Medical Necessity Information: It is in your best interest to select a reason for this procedure from the list below. All of these items fulfill various CMS LCD requirements except lesion extends to a margin.
Include Z78.9 (Other Specified Conditions Influencing Health Status) As An Associated Diagnosis?: No
Medical Necessity Clause: This procedure was medically necessary because the lesion that was treated was:
Lab: [id number]
Lab Facility: 
Surgeon (Optional): Dayan
Biopsy Photograph Reviewed: Yes
Size Of Lesion In Cm: 2
X Size Of Lesion In Cm (Optional): 1.9
Size Of Margin In Cm: 0.2
Anesthesia Volume In Cc: 8.7
Eye Clamp Note Details: An eye clamp was used during the procedure.
Excision Method: Elliptical
Saucerization Depth: dermis and superficial adipose tissue
Repair Type: Complex
Intermediate / Complex Repair - Final Wound Length In Cm: 2.1
Suturegard Retention Suture: 2-0 Nylon
Retention Suture Bite Size: 3 mm
Length To Time In Minutes Device Was In Place: 10
Number Of Hemigard Strips Per Side: 1
Width Of Defect Perpendicular To Closure In Cm (Required): 1.2
Distance Of Undermining In Cm (Required): 1.4
Undermining Type: Entire Wound
Debridement Text: The wound edges were debrided prior to proceeding with the closure to facilitate wound healing.
Helical Rim Text: The closure involved the helical rim.
Vermilion Border Text: The closure involved the vermilion border.
Nostril Rim Text: The closure involved the nostril rim.
Retention Suture Text: Retention sutures were placed to support the closure and prevent dehiscence.
Primary Defect Length (In Cm): 0
Epidermal Closure Graft Donor Site (Optional): simple interrupted
Graft Donor Site Bandage (Optional-Leave Blank If You Don't Want In Note): Steri-strips and a pressure bandage were applied to the donor site.
Detail Level: Detailed
Excision Depth: adipose tissue
Scalpel Size: 15 blade
Anesthesia Type: 1% lidocaine with 1:100,000 epinephrine and 408mcg clindamycin/ml and a 1:10 solution of 8.4% sodium bicarbonate
Additional Anesthesia Volume In Cc: 6
Hemostasis: Electrocautery
Estimated Blood Loss (Cc): minimal
Repair Anesthesia Method: local infiltration
Anesthesia Volume In Cc: 6.7
Deep Sutures: 4-0 Vicryl
Dermal Closure: buried vertical mattress
Epidermal Sutures: 4-0 Caprosyn
Epidermal Closure: running locked
Wound Care: Bacitracin
Dressing: steri-strips
Suturegard Intro: Intraoperative tissue expansion was performed, utilizing the SUTUREGARD device, in order to reduce wound tension.
Suturegard Body: The suture ends were repeatedly re-tightened and re-clamped to achieve the desired tissue expansion.
Hemigard Intro: Due to skin fragility and wound tension, it was decided to use HEMIGARD adhesive retention suture devices to permit a linear closure. The skin was cleaned and dried for a 6cm distance away from the wound. Excessive hair, if present, was removed to allow for adhesion.
Hemigard Postcare Instructions: The HEMIGARD strips are to remain completely dry for at least 5-7 days.
Positioning (Leave Blank If You Do Not Want): The patient was placed in a comfortable position exposing the surgical site.
Complex Repair Preamble Text (Leave Blank If You Do Not Want): Extensive wide undermining was performed.
Intermediate Repair Preamble Text (Leave Blank If You Do Not Want): Undermining was performed with blunt dissection.
Fusiform Excision Additional Text (Leave Blank If You Do Not Want): The margin was drawn around the clinically apparent lesion.  A fusiform shape was then drawn on the skin incorporating the lesion and margins.  Incisions were then made along these lines to the appropriate tissue plane and the lesion was extirpated.
Eliptical Excision Additional Text (Leave Blank If You Do Not Want): The margin was drawn around the clinically apparent lesion.  An elliptical shape was then drawn on the skin incorporating the lesion and margins.  Incisions were then made along these lines to the appropriate tissue plane and the lesion was extirpated.
Saucerization Excision Additional Text (Leave Blank If You Do Not Want): The margin was drawn around the clinically apparent lesion.  Incisions were then made along these lines, in a tangential fashion, to the appropriate tissue plane and the lesion was extirpated.
Slit Excision Additional Text (Leave Blank If You Do Not Want): A linear line was drawn on the skin overlying the lesion. An incision was made slowly until the lesion was visualized.  Once visualized, the lesion was removed with blunt dissection.
Excisional Biopsy Additional Text (Leave Blank If You Do Not Want): The margin was drawn around the clinically apparent lesion. An elliptical shape was then drawn on the skin incorporating the lesion and margins.  Incisions were then made along these lines to the appropriate tissue plane and the lesion was extirpated.
Perilesional Excision Additional Text (Leave Blank If You Do Not Want): The margin was drawn around the clinically apparent lesion. Incisions were then made along these lines to the appropriate tissue plane and the lesion was extirpated.
Repair Performed By Another Provider Text (Leave Blank If You Do Not Want): After the tissue was excised the defect was repaired by another provider.
No Repair - Repaired With Adjacent Surgical Defect Text (Leave Blank If You Do Not Want): After the excision the defect was repaired concurrently with another surgical defect which was in close approximation.
Adjacent Tissue Transfer Text: The defect edges were debeveled with a #15 scalpel blade. Given the location of the defect and the proximity to free margins an adjacent tissue transfer was deemed most appropriate. Using a sterile surgical marker, an appropriate flap was drawn incorporating the defect and placing the expected incisions within the relaxed skin tension lines where possible. The area thus outlined was incised deep to adipose tissue with a #15 scalpel blade. The skin margins were undermined to an appropriate distance in all directions utilizing iris scissors and carried over to close the primary defect.
Advancement Flap (Single) Text: The defect edges were debeveled with a #15 scalpel blade.  Given the location of the defect and the proximity to free margins a single advancement flap was deemed most appropriate.  Using a sterile surgical marker, an appropriate advancement flap was drawn incorporating the defect and placing the expected incisions within the relaxed skin tension lines where possible.    The area thus outlined was incised deep to adipose tissue with a #15 scalpel blade.  The skin margins were undermined to an appropriate distance in all directions utilizing iris scissors.
Advancement Flap (Double) Text: The defect edges were debeveled with a #15 scalpel blade.  Given the location of the defect and the proximity to free margins a double advancement flap was deemed most appropriate.  Using a sterile surgical marker, the appropriate advancement flaps were drawn incorporating the defect and placing the expected incisions within the relaxed skin tension lines where possible.    The area thus outlined was incised deep to adipose tissue with a #15 scalpel blade.  The skin margins were undermined to an appropriate distance in all directions utilizing iris scissors.
Burow's Advancement Flap Text: The defect edges were debeveled with a #15 scalpel blade.  Given the location of the defect and the proximity to free margins a Burow's advancement flap was deemed most appropriate.  Using a sterile surgical marker, the appropriate advancement flap was drawn incorporating the defect and placing the expected incisions within the relaxed skin tension lines where possible.    The area thus outlined was incised deep to adipose tissue with a #15 scalpel blade.  The skin margins were undermined to an appropriate distance in all directions utilizing iris scissors.
Chonodrocutaneous Helical Advancement Flap Text: The defect edges were debeveled with a #15 scalpel blade.  Given the location of the defect and the proximity to free margins a chondrocutaneous helical advancement flap was deemed most appropriate.  Using a sterile surgical marker, the appropriate advancement flap was drawn incorporating the defect and placing the expected incisions within the relaxed skin tension lines where possible.    The area thus outlined was incised deep to adipose tissue with a #15 scalpel blade.  The skin margins were undermined to an appropriate distance in all directions utilizing iris scissors.
Crescentic Advancement Flap Text: The defect edges were debeveled with a #15 scalpel blade.  Given the location of the defect and the proximity to free margins a crescentic advancement flap was deemed most appropriate.  Using a sterile surgical marker, the appropriate advancement flap was drawn incorporating the defect and placing the expected incisions within the relaxed skin tension lines where possible.    The area thus outlined was incised deep to adipose tissue with a #15 scalpel blade.  The skin margins were undermined to an appropriate distance in all directions utilizing iris scissors.
A-T Advancement Flap Text: The defect edges were debeveled with a #15 scalpel blade.  Given the location of the defect, shape of the defect and the proximity to free margins an A-T advancement flap was deemed most appropriate.  Using a sterile surgical marker, an appropriate advancement flap was drawn incorporating the defect and placing the expected incisions within the relaxed skin tension lines where possible.    The area thus outlined was incised deep to adipose tissue with a #15 scalpel blade.  The skin margins were undermined to an appropriate distance in all directions utilizing iris scissors.
O-T Advancement Flap Text: The defect edges were debeveled with a #15 scalpel blade.  Given the location of the defect, shape of the defect and the proximity to free margins an O-T advancement flap was deemed most appropriate.  Using a sterile surgical marker, an appropriate advancement flap was drawn incorporating the defect and placing the expected incisions within the relaxed skin tension lines where possible.    The area thus outlined was incised deep to adipose tissue with a #15 scalpel blade.  The skin margins were undermined to an appropriate distance in all directions utilizing iris scissors.
O-L Flap Text: The defect edges were debeveled with a #15 scalpel blade.  Given the location of the defect, shape of the defect and the proximity to free margins an O-L flap was deemed most appropriate.  Using a sterile surgical marker, an appropriate advancement flap was drawn incorporating the defect and placing the expected incisions within the relaxed skin tension lines where possible.    The area thus outlined was incised deep to adipose tissue with a #15 scalpel blade.  The skin margins were undermined to an appropriate distance in all directions utilizing iris scissors.
O-Z Flap Text: The defect edges were debeveled with a #15 scalpel blade.  Given the location of the defect, shape of the defect and the proximity to free margins an O-Z flap was deemed most appropriate.  Using a sterile surgical marker, an appropriate transposition flap was drawn incorporating the defect and placing the expected incisions within the relaxed skin tension lines where possible. The area thus outlined was incised deep to adipose tissue with a #15 scalpel blade.  The skin margins were undermined to an appropriate distance in all directions utilizing iris scissors.
Double O-Z Flap Text: The defect edges were debeveled with a #15 scalpel blade.  Given the location of the defect, shape of the defect and the proximity to free margins a Double O-Z flap was deemed most appropriate.  Using a sterile surgical marker, an appropriate transposition flap was drawn incorporating the defect and placing the expected incisions within the relaxed skin tension lines where possible. The area thus outlined was incised deep to adipose tissue with a #15 scalpel blade.  The skin margins were undermined to an appropriate distance in all directions utilizing iris scissors.
V-Y Flap Text: The defect edges were debeveled with a #15 scalpel blade.  Given the location of the defect, shape of the defect and the proximity to free margins a V-Y flap was deemed most appropriate.  Using a sterile surgical marker, an appropriate advancement flap was drawn incorporating the defect and placing the expected incisions within the relaxed skin tension lines where possible.    The area thus outlined was incised deep to adipose tissue with a #15 scalpel blade.  The skin margins were undermined to an appropriate distance in all directions utilizing iris scissors.
Advancement-Rotation Flap Text: The defect edges were debeveled with a #15 scalpel blade. Given the location of the defect, shape of the defect and the proximity to free margins an advancement-rotation flap was deemed most appropriate. Using a sterile surgical marker, an appropriate flap was drawn incorporating the defect and placing the expected incisions within the relaxed skin tension lines where possible. The area thus outlined was incised deep to adipose tissue with a #15 scalpel blade. The skin margins were undermined to an appropriate distance in all directions utilizing iris scissors. Following this, the designed flap was carried over into the primary defect and sutured into place.
Mercedes Flap Text: The defect edges were debeveled with a #15 scalpel blade.  Given the location of the defect, shape of the defect and the proximity to free margins a Mercedes flap was deemed most appropriate.  Using a sterile surgical marker, an appropriate advancement flap was drawn incorporating the defect and placing the expected incisions within the relaxed skin tension lines where possible. The area thus outlined was incised deep to adipose tissue with a #15 scalpel blade.  The skin margins were undermined to an appropriate distance in all directions utilizing iris scissors.
Modified Advancement Flap Text: The defect edges were debeveled with a #15 scalpel blade.  Given the location of the defect, shape of the defect and the proximity to free margins a modified advancement flap was deemed most appropriate.  Using a sterile surgical marker, an appropriate advancement flap was drawn incorporating the defect and placing the expected incisions within the relaxed skin tension lines where possible.    The area thus outlined was incised deep to adipose tissue with a #15 scalpel blade.  The skin margins were undermined to an appropriate distance in all directions utilizing iris scissors.
Mucosal Advancement Flap Text: Given the location of the defect, shape of the defect and the proximity to free margins a mucosal advancement flap was deemed most appropriate. Incisions were made with a 15 blade scalpel in the appropriate fashion along the cutaneous vermillion border and the mucosal lip. The remaining actinically damaged mucosal tissue was excised.  The mucosal advancement flap was then elevated to the gingival sulcus with care taken to preserve the neurovascular structures and advanced into the primary defect. Care was taken to ensure that precise realignment of the vermilion border was achieved.
Peng Advancement Flap Text: The defect edges were debeveled with a #15 scalpel blade. Given the location of the defect, shape of the defect and the proximity to free margins a Peng advancement flap was deemed most appropriate. Using a sterile surgical marker, an appropriate advancement flap was drawn incorporating the defect and placing the expected incisions within the relaxed skin tension lines where possible. The area thus outlined was incised deep to adipose tissue with a #15 scalpel blade. The skin margins were undermined to an appropriate distance in all directions utilizing iris scissors. Following this, the designed flap was advanced and carried over into the primary defect and sutured into place.
Hatchet Flap Text: The defect edges were debeveled with a #15 scalpel blade.  Given the location of the defect, shape of the defect and the proximity to free margins a hatchet flap was deemed most appropriate.  Using a sterile surgical marker, an appropriate hatchet flap was drawn incorporating the defect and placing the expected incisions within the relaxed skin tension lines where possible.    The area thus outlined was incised deep to adipose tissue with a #15 scalpel blade.  The skin margins were undermined to an appropriate distance in all directions utilizing iris scissors.
Rotation Flap Text: The defect edges were debeveled with a #15 scalpel blade.  Given the location of the defect, shape of the defect and the proximity to free margins a rotation flap was deemed most appropriate.  Using a sterile surgical marker, an appropriate rotation flap was drawn incorporating the defect and placing the expected incisions within the relaxed skin tension lines where possible.    The area thus outlined was incised deep to adipose tissue with a #15 scalpel blade.  The skin margins were undermined to an appropriate distance in all directions utilizing iris scissors.
Bilateral Rotation Flap Text: The defect edges were debeveled with a #15 scalpel blade. Given the location of the defect, shape of the defect and the proximity to free margins a bilateral rotation flap was deemed most appropriate. Using a sterile surgical marker, an appropriate rotation flap was drawn incorporating the defect and placing the expected incisions within the relaxed skin tension lines where possible. The area thus outlined was incised deep to adipose tissue with a #15 scalpel blade. The skin margins were undermined to an appropriate distance in all directions utilizing iris scissors. Following this, the designed flap was carried over into the primary defect and sutured into place.
Spiral Flap Text: The defect edges were debeveled with a #15 scalpel blade.  Given the location of the defect, shape of the defect and the proximity to free margins a spiral flap was deemed most appropriate.  Using a sterile surgical marker, an appropriate rotation flap was drawn incorporating the defect and placing the expected incisions within the relaxed skin tension lines where possible. The area thus outlined was incised deep to adipose tissue with a #15 scalpel blade.  The skin margins were undermined to an appropriate distance in all directions utilizing iris scissors.
Staged Advancement Flap Text: The defect edges were debeveled with a #15 scalpel blade. Given the location of the defect, shape of the defect and the proximity to free margins a staged advancement flap was deemed most appropriate. Using a sterile surgical marker, an appropriate advancement flap was drawn incorporating the defect and placing the expected incisions within the relaxed skin tension lines where possible. The area thus outlined was incised deep to adipose tissue with a #15 scalpel blade. The skin margins were undermined to an appropriate distance in all directions utilizing iris scissors. Following this, the designed flap was carried over into the primary defect and sutured into place.
Star Wedge Flap Text: The defect edges were debeveled with a #15 scalpel blade.  Given the location of the defect, shape of the defect and the proximity to free margins a star wedge flap was deemed most appropriate.  Using a sterile surgical marker, an appropriate rotation flap was drawn incorporating the defect and placing the expected incisions within the relaxed skin tension lines where possible. The area thus outlined was incised deep to adipose tissue with a #15 scalpel blade.  The skin margins were undermined to an appropriate distance in all directions utilizing iris scissors.
Transposition Flap Text: The defect edges were debeveled with a #15 scalpel blade.  Given the location of the defect and the proximity to free margins a transposition flap was deemed most appropriate.  Using a sterile surgical marker, an appropriate transposition flap was drawn incorporating the defect.    The area thus outlined was incised deep to adipose tissue with a #15 scalpel blade.  The skin margins were undermined to an appropriate distance in all directions utilizing iris scissors.
Muscle Hinge Flap Text: The defect edges were debeveled with a #15 scalpel blade.  Given the size, depth and location of the defect and the proximity to free margins a muscle hinge flap was deemed most appropriate.  Using a sterile surgical marker, an appropriate hinge flap was drawn incorporating the defect. The area thus outlined was incised with a #15 scalpel blade.  The skin margins were undermined to an appropriate distance in all directions utilizing iris scissors.
Mustarde Flap Text: The defect edges were debeveled with a #15 scalpel blade.  Given the size, depth and location of the defect and the proximity to free margins a Mustarde flap was deemed most appropriate. Using a sterile surgical marker, an appropriate flap was drawn incorporating the defect. The area thus outlined was incised with a #15 scalpel blade. The skin margins were undermined to an appropriate distance in all directions utilizing iris scissors. Following this, the designed flap was carried into the primary defect and sutured into place.
Nasal Turnover Hinge Flap Text: The defect edges were debeveled with a #15 scalpel blade.  Given the size, depth, location of the defect and the defect being full thickness a nasal turnover hinge flap was deemed most appropriate.  Using a sterile surgical marker, an appropriate hinge flap was drawn incorporating the defect. The area thus outlined was incised with a #15 scalpel blade. The flap was designed to recreate the nasal mucosal lining and the alar rim. The skin margins were undermined to an appropriate distance in all directions utilizing iris scissors.
Nasalis-Muscle-Based Myocutaneous Island Pedicle Flap Text: Using a #15 blade, an incision was made around the donor flap to the level of the nasalis muscle. Wide lateral undermining was then performed in both the subcutaneous plane above the nasalis muscle, and in a submuscular plane just above periosteum. This allowed the formation of a free nasalis muscle axial pedicle (based on the angular artery) which was still attached to the actual cutaneous flap, increasing its mobility and vascular viability. Hemostasis was obtained with pinpoint electrocoagulation. The flap was mobilized into position and the pivotal anchor points positioned and stabilized with buried interrupted sutures. Subcutaneous and dermal tissues were closed in a multilayered fashion with sutures. Tissue redundancies were excised, and the epidermal edges were apposed without significant tension and sutured with sutures.
Orbicularis Oris Muscle Flap Text: The defect edges were debeveled with a #15 scalpel blade.  Given that the defect affected the competency of the oral sphincter an obicularis oris muscle flap was deemed most appropriate to restore this competency and normal muscle function.  Using a sterile surgical marker, an appropriate flap was drawn incorporating the defect. The area thus outlined was incised with a #15 scalpel blade.
Melolabial Transposition Flap Text: The defect edges were debeveled with a #15 scalpel blade.  Given the location of the defect and the proximity to free margins a melolabial flap was deemed most appropriate.  Using a sterile surgical marker, an appropriate melolabial transposition flap was drawn incorporating the defect.    The area thus outlined was incised deep to adipose tissue with a #15 scalpel blade.  The skin margins were undermined to an appropriate distance in all directions utilizing iris scissors.
Rhombic Flap Text: The defect edges were debeveled with a #15 scalpel blade.  Given the location of the defect and the proximity to free margins a rhombic flap was deemed most appropriate.  Using a sterile surgical marker, an appropriate rhombic flap was drawn incorporating the defect.    The area thus outlined was incised deep to adipose tissue with a #15 scalpel blade.  The skin margins were undermined to an appropriate distance in all directions utilizing iris scissors.
Rhomboid Transposition Flap Text: The defect edges were debeveled with a #15 scalpel blade.  Given the location of the defect and the proximity to free margins a rhomboid transposition flap was deemed most appropriate.  Using a sterile surgical marker, an appropriate rhomboid flap was drawn incorporating the defect.    The area thus outlined was incised deep to adipose tissue with a #15 scalpel blade.  The skin margins were undermined to an appropriate distance in all directions utilizing iris scissors.
Bi-Rhombic Flap Text: The defect edges were debeveled with a #15 scalpel blade.  Given the location of the defect and the proximity to free margins a bi-rhombic flap was deemed most appropriate.  Using a sterile surgical marker, an appropriate rhombic flap was drawn incorporating the defect. The area thus outlined was incised deep to adipose tissue with a #15 scalpel blade.  The skin margins were undermined to an appropriate distance in all directions utilizing iris scissors.
Helical Rim Advancement Flap Text: The defect edges were debeveled with a #15 blade scalpel.  Given the location of the defect and the proximity to free margins (helical rim) a double helical rim advancement flap was deemed most appropriate.  Using a sterile surgical marker, the appropriate advancement flaps were drawn incorporating the defect and placing the expected incisions between the helical rim and antihelix where possible.  The area thus outlined was incised through and through with a #15 scalpel blade.  With a skin hook and iris scissors, the flaps were gently and sharply undermined and freed up.
Bilateral Helical Rim Advancement Flap Text: The defect edges were debeveled with a #15 blade scalpel.  Given the location of the defect and the proximity to free margins (helical rim) a bilateral helical rim advancement flap was deemed most appropriate.  Using a sterile surgical marker, the appropriate advancement flaps were drawn incorporating the defect and placing the expected incisions between the helical rim and antihelix where possible.  The area thus outlined was incised through and through with a #15 scalpel blade.  With a skin hook and iris scissors, the flaps were gently and sharply undermined and freed up.
Ear Star Wedge Flap Text: The defect edges were debeveled with a #15 blade scalpel.  Given the location of the defect and the proximity to free margins (helical rim) an ear star wedge flap was deemed most appropriate.  Using a sterile surgical marker, the appropriate flap was drawn incorporating the defect and placing the expected incisions between the helical rim and antihelix where possible.  The area thus outlined was incised through and through with a #15 scalpel blade.
Banner Transposition Flap Text: The defect edges were debeveled with a #15 scalpel blade.  Given the location of the defect and the proximity to free margins a Banner transposition flap was deemed most appropriate.  Using a sterile surgical marker, an appropriate flap drawn around the defect. The area thus outlined was incised deep to adipose tissue with a #15 scalpel blade.  The skin margins were undermined to an appropriate distance in all directions utilizing iris scissors.
Bilobed Flap Text: The defect edges were debeveled with a #15 scalpel blade.  Given the location of the defect and the proximity to free margins a bilobe flap was deemed most appropriate.  Using a sterile surgical marker, an appropriate bilobe flap drawn around the defect.    The area thus outlined was incised deep to adipose tissue with a #15 scalpel blade.  The skin margins were undermined to an appropriate distance in all directions utilizing iris scissors.
Bilobed Transposition Flap Text: The defect edges were debeveled with a #15 scalpel blade.  Given the location of the defect and the proximity to free margins a bilobed transposition flap was deemed most appropriate.  Using a sterile surgical marker, an appropriate bilobe flap drawn around the defect.    The area thus outlined was incised deep to adipose tissue with a #15 scalpel blade.  The skin margins were undermined to an appropriate distance in all directions utilizing iris scissors.
Trilobed Flap Text: The defect edges were debeveled with a #15 scalpel blade.  Given the location of the defect and the proximity to free margins a trilobed flap was deemed most appropriate.  Using a sterile surgical marker, an appropriate trilobed flap drawn around the defect.    The area thus outlined was incised deep to adipose tissue with a #15 scalpel blade.  The skin margins were undermined to an appropriate distance in all directions utilizing iris scissors.
Dorsal Nasal Flap Text: The defect edges were debeveled with a #15 scalpel blade.  Given the location of the defect and the proximity to free margins a dorsal nasal flap was deemed most appropriate.  Using a sterile surgical marker, an appropriate dorsal nasal flap was drawn around the defect.    The area thus outlined was incised deep to adipose tissue with a #15 scalpel blade.  The skin margins were undermined to an appropriate distance in all directions utilizing iris scissors.
Island Pedicle Flap Text: The defect edges were debeveled with a #15 scalpel blade.  Given the location of the defect, shape of the defect and the proximity to free margins an island pedicle advancement flap was deemed most appropriate.  Using a sterile surgical marker, an appropriate advancement flap was drawn incorporating the defect, outlining the appropriate donor tissue and placing the expected incisions within the relaxed skin tension lines where possible.    The area thus outlined was incised deep to adipose tissue with a #15 scalpel blade.  The skin margins were undermined to an appropriate distance in all directions around the primary defect and laterally outward around the island pedicle utilizing iris scissors.  There was minimal undermining beneath the pedicle flap.
Island Pedicle Flap With Canthal Suspension Text: The defect edges were debeveled with a #15 scalpel blade.  Given the location of the defect, shape of the defect and the proximity to free margins an island pedicle advancement flap was deemed most appropriate.  Using a sterile surgical marker, an appropriate advancement flap was drawn incorporating the defect, outlining the appropriate donor tissue and placing the expected incisions within the relaxed skin tension lines where possible. The area thus outlined was incised deep to adipose tissue with a #15 scalpel blade.  The skin margins were undermined to an appropriate distance in all directions around the primary defect and laterally outward around the island pedicle utilizing iris scissors.  There was minimal undermining beneath the pedicle flap. A suspension suture was placed in the canthal tendon to prevent tension and prevent ectropion.
Alar Island Pedicle Flap Text: The defect edges were debeveled with a #15 scalpel blade.  Given the location of the defect, shape of the defect and the proximity to the alar rim an island pedicle advancement flap was deemed most appropriate.  Using a sterile surgical marker, an appropriate advancement flap was drawn incorporating the defect, outlining the appropriate donor tissue and placing the expected incisions within the nasal ala running parallel to the alar rim. The area thus outlined was incised with a #15 scalpel blade.  The skin margins were undermined minimally to an appropriate distance in all directions around the primary defect and laterally outward around the island pedicle utilizing iris scissors.  There was minimal undermining beneath the pedicle flap.
Double Island Pedicle Flap Text: The defect edges were debeveled with a #15 scalpel blade.  Given the location of the defect, shape of the defect and the proximity to free margins a double island pedicle advancement flap was deemed most appropriate.  Using a sterile surgical marker, an appropriate advancement flap was drawn incorporating the defect, outlining the appropriate donor tissue and placing the expected incisions within the relaxed skin tension lines where possible.    The area thus outlined was incised deep to adipose tissue with a #15 scalpel blade.  The skin margins were undermined to an appropriate distance in all directions around the primary defect and laterally outward around the island pedicle utilizing iris scissors.  There was minimal undermining beneath the pedicle flap.
Island Pedicle Flap-Requiring Vessel Identification Text: The defect edges were debeveled with a #15 scalpel blade.  Given the location of the defect, shape of the defect and the proximity to free margins an island pedicle advancement flap was deemed most appropriate.  Using a sterile surgical marker, an appropriate advancement flap was drawn, based on the axial vessel mentioned above, incorporating the defect, outlining the appropriate donor tissue and placing the expected incisions within the relaxed skin tension lines where possible.    The area thus outlined was incised deep to adipose tissue with a #15 scalpel blade.  The skin margins were undermined to an appropriate distance in all directions around the primary defect and laterally outward around the island pedicle utilizing iris scissors.  There was minimal undermining beneath the pedicle flap.
Keystone Flap Text: The defect edges were debeveled with a #15 scalpel blade.  Given the location of the defect, shape of the defect a keystone flap was deemed most appropriate.  Using a sterile surgical marker, an appropriate keystone flap was drawn incorporating the defect, outlining the appropriate donor tissue and placing the expected incisions within the relaxed skin tension lines where possible. The area thus outlined was incised deep to adipose tissue with a #15 scalpel blade.  The skin margins were undermined to an appropriate distance in all directions around the primary defect and laterally outward around the flap utilizing iris scissors.
O-T Plasty Text: The defect edges were debeveled with a #15 scalpel blade.  Given the location of the defect, shape of the defect and the proximity to free margins an O-T plasty was deemed most appropriate.  Using a sterile surgical marker, an appropriate O-T plasty was drawn incorporating the defect and placing the expected incisions within the relaxed skin tension lines where possible.    The area thus outlined was incised deep to adipose tissue with a #15 scalpel blade.  The skin margins were undermined to an appropriate distance in all directions utilizing iris scissors.
O-Z Plasty Text: The defect edges were debeveled with a #15 scalpel blade.  Given the location of the defect, shape of the defect and the proximity to free margins an O-Z plasty (double transposition flap) was deemed most appropriate.  Using a sterile surgical marker, the appropriate transposition flaps were drawn incorporating the defect and placing the expected incisions within the relaxed skin tension lines where possible.    The area thus outlined was incised deep to adipose tissue with a #15 scalpel blade.  The skin margins were undermined to an appropriate distance in all directions utilizing iris scissors.  Hemostasis was achieved with electrocautery.  The flaps were then transposed into place, one clockwise and the other counterclockwise, and anchored with interrupted buried subcutaneous sutures.
Double O-Z Plasty Text: The defect edges were debeveled with a #15 scalpel blade.  Given the location of the defect, shape of the defect and the proximity to free margins a Double O-Z plasty (double transposition flap) was deemed most appropriate.  Using a sterile surgical marker, the appropriate transposition flaps were drawn incorporating the defect and placing the expected incisions within the relaxed skin tension lines where possible. The area thus outlined was incised deep to adipose tissue with a #15 scalpel blade.  The skin margins were undermined to an appropriate distance in all directions utilizing iris scissors.  Hemostasis was achieved with electrocautery.  The flaps were then transposed into place, one clockwise and the other counterclockwise, and anchored with interrupted buried subcutaneous sutures.
V-Y Plasty Text: The defect edges were debeveled with a #15 scalpel blade.  Given the location of the defect, shape of the defect and the proximity to free margins an V-Y advancement flap was deemed most appropriate.  Using a sterile surgical marker, an appropriate advancement flap was drawn incorporating the defect and placing the expected incisions within the relaxed skin tension lines where possible.    The area thus outlined was incised deep to adipose tissue with a #15 scalpel blade.  The skin margins were undermined to an appropriate distance in all directions utilizing iris scissors.
H Plasty Text: Given the location of the defect, shape of the defect and the proximity to free margins a H-plasty was deemed most appropriate for repair.  Using a sterile surgical marker, the appropriate advancement arms of the H-plasty were drawn incorporating the defect and placing the expected incisions within the relaxed skin tension lines where possible. The area thus outlined was incised deep to adipose tissue with a #15 scalpel blade. The skin margins were undermined to an appropriate distance in all directions utilizing iris scissors.  The opposing advancement arms were then advanced into place in opposite direction and anchored with interrupted buried subcutaneous sutures.
W Plasty Text: The lesion was extirpated to the level of the fat with a #15 scalpel blade.  Given the location of the defect, shape of the defect and the proximity to free margins a W-plasty was deemed most appropriate for repair.  Using a sterile surgical marker, the appropriate transposition arms of the W-plasty were drawn incorporating the defect and placing the expected incisions within the relaxed skin tension lines where possible.    The area thus outlined was incised deep to adipose tissue with a #15 scalpel blade.  The skin margins were undermined to an appropriate distance in all directions utilizing iris scissors.  The opposing transposition arms were then transposed into place in opposite direction and anchored with interrupted buried subcutaneous sutures.
Z Plasty Text: The lesion was extirpated to the level of the fat with a #15 scalpel blade.  Given the location of the defect, shape of the defect and the proximity to free margins a Z-plasty was deemed most appropriate for repair.  Using a sterile surgical marker, the appropriate transposition arms of the Z-plasty were drawn incorporating the defect and placing the expected incisions within the relaxed skin tension lines where possible.    The area thus outlined was incised deep to adipose tissue with a #15 scalpel blade.  The skin margins were undermined to an appropriate distance in all directions utilizing iris scissors.  The opposing transposition arms were then transposed into place in opposite direction and anchored with interrupted buried subcutaneous sutures.
Double Z Plasty Text: The lesion was extirpated to the level of the fat with a #15 scalpel blade. Given the location of the defect, shape of the defect and the proximity to free margins a double Z-plasty was deemed most appropriate for repair. Using a sterile surgical marker, the appropriate transposition arms of the double Z-plasty were drawn incorporating the defect and placing the expected incisions within the relaxed skin tension lines where possible. The area thus outlined was incised deep to adipose tissue with a #15 scalpel blade. The skin margins were undermined to an appropriate distance in all directions utilizing iris scissors. The opposing transposition arms were then transposed and carried over into place in opposite direction and anchored with interrupted buried subcutaneous sutures.
Zygomaticofacial Flap Text: Given the location of the defect, shape of the defect and the proximity to free margins a zygomaticofacial flap was deemed most appropriate for repair.  Using a sterile surgical marker, the appropriate flap was drawn incorporating the defect and placing the expected incisions within the relaxed skin tension lines where possible. The area thus outlined was incised deep to adipose tissue with a #15 scalpel blade with preservation of a vascular pedicle.  The skin margins were undermined to an appropriate distance in all directions utilizing iris scissors.  The flap was then placed into the defect and anchored with interrupted buried subcutaneous sutures.
Cheek Interpolation Flap Text: A decision was made to reconstruct the defect utilizing an interpolation axial flap and a staged reconstruction.  A telfa template was made of the defect.  This telfa template was then used to outline the Cheek Interpolation flap.  The donor area for the pedicle flap was then injected with anesthesia.  The flap was excised through the skin and subcutaneous tissue down to the layer of the underlying musculature.  The interpolation flap was carefully excised within this deep plane to maintain its blood supply.  The edges of the donor site were undermined.   The donor site was closed in a primary fashion.  The pedicle was then rotated into position and sutured.  Once the tube was sutured into place, adequate blood supply was confirmed with blanching and refill.  The pedicle was then wrapped with xeroform gauze and dressed appropriately with a telfa and gauze bandage to ensure continued blood supply and protect the attached pedicle.
Cheek-To-Nose Interpolation Flap Text: A decision was made to reconstruct the defect utilizing an interpolation axial flap and a staged reconstruction.  A telfa template was made of the defect.  This telfa template was then used to outline the Cheek-To-Nose Interpolation flap.  The donor area for the pedicle flap was then injected with anesthesia.  The flap was excised through the skin and subcutaneous tissue down to the layer of the underlying musculature.  The interpolation flap was carefully excised within this deep plane to maintain its blood supply.  The edges of the donor site were undermined.   The donor site was closed in a primary fashion.  The pedicle was then rotated into position and sutured.  Once the tube was sutured into place, adequate blood supply was confirmed with blanching and refill.  The pedicle was then wrapped with xeroform gauze and dressed appropriately with a telfa and gauze bandage to ensure continued blood supply and protect the attached pedicle.
Interpolation Flap Text: A decision was made to reconstruct the defect utilizing an interpolation axial flap and a staged reconstruction.  A telfa template was made of the defect.  This telfa template was then used to outline the interpolation flap.  The donor area for the pedicle flap was then injected with anesthesia.  The flap was excised through the skin and subcutaneous tissue down to the layer of the underlying musculature.  The interpolation flap was carefully excised within this deep plane to maintain its blood supply.  The edges of the donor site were undermined.   The donor site was closed in a primary fashion.  The pedicle was then rotated into position and sutured.  Once the tube was sutured into place, adequate blood supply was confirmed with blanching and refill.  The pedicle was then wrapped with xeroform gauze and dressed appropriately with a telfa and gauze bandage to ensure continued blood supply and protect the attached pedicle.
Melolabial Interpolation Flap Text: A decision was made to reconstruct the defect utilizing an interpolation axial flap and a staged reconstruction.  A telfa template was made of the defect.  This telfa template was then used to outline the melolabial interpolation flap.  The donor area for the pedicle flap was then injected with anesthesia.  The flap was excised through the skin and subcutaneous tissue down to the layer of the underlying musculature.  The pedicle flap was carefully excised within this deep plane to maintain its blood supply.  The edges of the donor site were undermined.   The donor site was closed in a primary fashion.  The pedicle was then rotated into position and sutured.  Once the tube was sutured into place, adequate blood supply was confirmed with blanching and refill.  The pedicle was then wrapped with xeroform gauze and dressed appropriately with a telfa and gauze bandage to ensure continued blood supply and protect the attached pedicle.
Mastoid Interpolation Flap Text: A decision was made to reconstruct the defect utilizing an interpolation axial flap and a staged reconstruction.  A telfa template was made of the defect.  This telfa template was then used to outline the mastoid interpolation flap.  The donor area for the pedicle flap was then injected with anesthesia.  The flap was excised through the skin and subcutaneous tissue down to the layer of the underlying musculature.  The pedicle flap was carefully excised within this deep plane to maintain its blood supply.  The edges of the donor site were undermined.   The donor site was closed in a primary fashion.  The pedicle was then rotated into position and sutured.  Once the tube was sutured into place, adequate blood supply was confirmed with blanching and refill.  The pedicle was then wrapped with xeroform gauze and dressed appropriately with a telfa and gauze bandage to ensure continued blood supply and protect the attached pedicle.
Posterior Auricular Interpolation Flap Text: A decision was made to reconstruct the defect utilizing an interpolation axial flap and a staged reconstruction.  A telfa template was made of the defect.  This telfa template was then used to outline the posterior auricular interpolation flap.  The donor area for the pedicle flap was then injected with anesthesia.  The flap was excised through the skin and subcutaneous tissue down to the layer of the underlying musculature.  The pedicle flap was carefully excised within this deep plane to maintain its blood supply.  The edges of the donor site were undermined.   The donor site was closed in a primary fashion.  The pedicle was then rotated into position and sutured.  Once the tube was sutured into place, adequate blood supply was confirmed with blanching and refill.  The pedicle was then wrapped with xeroform gauze and dressed appropriately with a telfa and gauze bandage to ensure continued blood supply and protect the attached pedicle.
Paramedian Forehead Flap Text: A decision was made to reconstruct the defect utilizing an interpolation axial flap and a staged reconstruction.  A telfa template was made of the defect.  This telfa template was then used to outline the paramedian forehead pedicle flap.  The donor area for the pedicle flap was then injected with anesthesia.  The flap was excised through the skin and subcutaneous tissue down to the layer of the underlying musculature.  The pedicle flap was carefully excised within this deep plane to maintain its blood supply.  The edges of the donor site were undermined.   The donor site was closed in a primary fashion.  The pedicle was then rotated into position and sutured.  Once the tube was sutured into place, adequate blood supply was confirmed with blanching and refill.  The pedicle was then wrapped with xeroform gauze and dressed appropriately with a telfa and gauze bandage to ensure continued blood supply and protect the attached pedicle.
Abbe Flap (Upper To Lower Lip) Text: The defect of the lower lip was assessed and measured.  Given the location and size of the defect, an Abbe flap was deemed most appropriate. Using a sterile surgical marker, an appropriate Abbe flap was measured and drawn on the upper lip. Local anesthesia was then infiltrated.  A scalpel was then used to incise the upper lip through and through the skin, vermilion, muscle and mucosa, leaving the flap pedicled on the opposite side.  The flap was then rotated and transferred to the lower lip defect.  The flap was then sutured into place with a three layer technique, closing the orbicularis oris muscle layer with subcutaneous buried sutures, followed by a mucosal layer and an epidermal layer.
Abbe Flap (Lower To Upper Lip) Text: The defect of the upper lip was assessed and measured.  Given the location and size of the defect, an Abbe flap was deemed most appropriate. Using a sterile surgical marker, an appropriate Abbe flap was measured and drawn on the lower lip. Local anesthesia was then infiltrated. A scalpel was then used to incise the upper lip through and through the skin, vermilion, muscle and mucosa, leaving the flap pedicled on the opposite side.  The flap was then rotated and transferred to the lower lip defect.  The flap was then sutured into place with a three layer technique, closing the orbicularis oris muscle layer with subcutaneous buried sutures, followed by a mucosal layer and an epidermal layer.
Estlander Flap (Upper To Lower Lip) Text: The defect of the lower lip was assessed and measured.  Given the location and size of the defect, an Estlander flap was deemed most appropriate. Using a sterile surgical marker, an appropriate Estlander flap was measured and drawn on the upper lip. Local anesthesia was then infiltrated. A scalpel was then used to incise the lateral aspect of the flap, through skin, muscle and mucosa, leaving the flap pedicled medially.  The flap was then rotated and positioned to fill the lower lip defect.  The flap was then sutured into place with a three layer technique, closing the orbicularis oris muscle layer with subcutaneous buried sutures, followed by a mucosal layer and an epidermal layer.
Lip Wedge Excision Repair Text: Given the location of the defect and the proximity to free margins a full thickness wedge repair was deemed most appropriate.  Using a sterile surgical marker, the appropriate repair was drawn incorporating the defect and placing the expected incisions perpendicular to the vermilion border.  The vermilion border was also meticulously outlined to ensure appropriate reapproximation during the repair.  The area thus outlined was incised through and through with a #15 scalpel blade.  The muscularis and dermis were reaproximated with deep sutures following hemostasis. Care was taken to realign the vermilion border before proceeding with the superficial closure.  Once the vermilion was realigned the superfical and mucosal closure was finished.
Ftsg Text: The defect edges were debeveled with a #15 scalpel blade.  Given the location of the defect, shape of the defect and the proximity to free margins a full thickness skin graft was deemed most appropriate.  Using a sterile surgical marker, the primary defect shape was transferred to the donor site. The area thus outlined was incised deep to adipose tissue with a #15 scalpel blade.  The harvested graft was then trimmed of adipose tissue until only dermis and epidermis was left.  The skin margins of the secondary defect were undermined to an appropriate distance in all directions utilizing iris scissors.  The secondary defect was closed with interrupted buried subcutaneous sutures.  The skin edges were then re-apposed with running  sutures.  The skin graft was then placed in the primary defect and oriented appropriately.
Split-Thickness Skin Graft Text: The defect edges were debeveled with a #15 scalpel blade.  Given the location of the defect, shape of the defect and the proximity to free margins a split thickness skin graft was deemed most appropriate.  Using a sterile surgical marker, the primary defect shape was transferred to the donor site. The split thickness graft was then harvested.  The skin graft was then placed in the primary defect and oriented appropriately.
Pinch Graft Text: The defect edges were debeveled with a #15 scalpel blade. Given the location of the defect, shape of the defect and the proximity to free margins a pinch graft was deemed most appropriate. Using a sterile surgical marker, the primary defect shape was transferred to the donor site. The area thus outlined was incised deep to adipose tissue with a #15 scalpel blade.  The harvested graft was then trimmed of adipose tissue until only dermis and epidermis was left. The skin margins of the secondary defect were undermined to an appropriate distance in all directions utilizing iris scissors.  The secondary defect was closed with interrupted buried subcutaneous sutures.  The skin edges were then re-apposed with running  sutures.  The skin graft was then placed in the primary defect and oriented appropriately.
Burow's Graft Text: The defect edges were debeveled with a #15 scalpel blade.  Given the location of the defect, shape of the defect, the proximity to free margins and the presence of a standing cone deformity a Burow's skin graft was deemed most appropriate. The standing cone was removed and this tissue was then trimmed to the shape of the primary defect. The adipose tissue was also removed until only dermis and epidermis were left.  The skin margins of the secondary defect were undermined to an appropriate distance in all directions utilizing iris scissors.  The secondary defect was closed with interrupted buried subcutaneous sutures.  The skin edges were then re-apposed with running  sutures.  The skin graft was then placed in the primary defect and oriented appropriately.
Cartilage Graft Text: The defect edges were debeveled with a #15 scalpel blade.  Given the location of the defect, shape of the defect, the fact the defect involved a full thickness cartilage defect a cartilage graft was deemed most appropriate.  An appropriate donor site was identified, cleansed, and anesthetized. The cartilage graft was then harvested and transferred to the recipient site, oriented appropriately and then sutured into place.  The secondary defect was then repaired using a primary closure.
Composite Graft Text: The defect edges were debeveled with a #15 scalpel blade.  Given the location of the defect, shape of the defect, the proximity to free margins and the fact the defect was full thickness a composite graft was deemed most appropriate.  The defect was outline and then transferred to the donor site.  A full thickness graft was then excised from the donor site. The graft was then placed in the primary defect, oriented appropriately and then sutured into place.  The secondary defect was then repaired using a primary closure.
Epidermal Autograft Text: The defect edges were debeveled with a #15 scalpel blade.  Given the location of the defect, shape of the defect and the proximity to free margins an epidermal autograft was deemed most appropriate.  Using a sterile surgical marker, the primary defect shape was transferred to the donor site. The epidermal graft was then harvested.  The skin graft was then placed in the primary defect and oriented appropriately.
Dermal Autograft Text: The defect edges were debeveled with a #15 scalpel blade.  Given the location of the defect, shape of the defect and the proximity to free margins a dermal autograft was deemed most appropriate.  Using a sterile surgical marker, the primary defect shape was transferred to the donor site. The area thus outlined was incised deep to adipose tissue with a #15 scalpel blade.  The harvested graft was then trimmed of adipose and epidermal tissue until only dermis was left.  The skin graft was then placed in the primary defect and oriented appropriately.
Skin Substitute Text: The defect edges were debeveled with a #15 scalpel blade.  Given the location of the defect, shape of the defect and the proximity to free margins a skin substitute graft was deemed most appropriate.  The graft material was trimmed to fit the size of the defect. The graft was then placed in the primary defect and oriented appropriately.
Tissue Cultured Epidermal Autograft Text: The defect edges were debeveled with a #15 scalpel blade.  Given the location of the defect, shape of the defect and the proximity to free margins a tissue cultured epidermal autograft was deemed most appropriate.  The graft was then trimmed to fit the size of the defect.  The graft was then placed in the primary defect and oriented appropriately.
Xenograft Text: The defect edges were debeveled with a #15 scalpel blade.  Given the location of the defect, shape of the defect and the proximity to free margins a xenograft was deemed most appropriate.  The graft was then trimmed to fit the size of the defect.  The graft was then placed in the primary defect and oriented appropriately.
Purse String (Intermediate) Text: Given the location of the defect and the characteristics of the surrounding skin a purse string intermediate closure was deemed most appropriate.  Undermining was performed circumferentially around the surgical defect.  A purse string suture was then placed and tightened.
Purse String (Simple) Text: Given the location of the defect and the characteristics of the surrounding skin a purse string simple closure was deemed most appropriate.  Undermining was performed circumferentially around the surgical defect.  A purse string suture was then placed and tightened.
Complex Repair And Single Advancement Flap Text: The defect edges were debeveled with a #15 scalpel blade.  The primary defect was closed partially with a complex linear closure.  Given the location of the remaining defect, shape of the defect and the proximity to free margins a single advancement flap was deemed most appropriate for complete closure of the defect.  Using a sterile surgical marker, an appropriate advancement flap was drawn incorporating the defect and placing the expected incisions within the relaxed skin tension lines where possible.    The area thus outlined was incised deep to adipose tissue with a #15 scalpel blade.  The skin margins were undermined to an appropriate distance in all directions utilizing iris scissors.
Complex Repair And Double Advancement Flap Text: The defect edges were debeveled with a #15 scalpel blade.  The primary defect was closed partially with a complex linear closure.  Given the location of the remaining defect, shape of the defect and the proximity to free margins a double advancement flap was deemed most appropriate for complete closure of the defect.  Using a sterile surgical marker, an appropriate advancement flap was drawn incorporating the defect and placing the expected incisions within the relaxed skin tension lines where possible.    The area thus outlined was incised deep to adipose tissue with a #15 scalpel blade.  The skin margins were undermined to an appropriate distance in all directions utilizing iris scissors.
Complex Repair And Modified Advancement Flap Text: The defect edges were debeveled with a #15 scalpel blade.  The primary defect was closed partially with a complex linear closure.  Given the location of the remaining defect, shape of the defect and the proximity to free margins a modified advancement flap was deemed most appropriate for complete closure of the defect.  Using a sterile surgical marker, an appropriate advancement flap was drawn incorporating the defect and placing the expected incisions within the relaxed skin tension lines where possible.    The area thus outlined was incised deep to adipose tissue with a #15 scalpel blade.  The skin margins were undermined to an appropriate distance in all directions utilizing iris scissors.
Complex Repair And A-T Advancement Flap Text: The defect edges were debeveled with a #15 scalpel blade.  The primary defect was closed partially with a complex linear closure.  Given the location of the remaining defect, shape of the defect and the proximity to free margins an A-T advancement flap was deemed most appropriate for complete closure of the defect.  Using a sterile surgical marker, an appropriate advancement flap was drawn incorporating the defect and placing the expected incisions within the relaxed skin tension lines where possible.    The area thus outlined was incised deep to adipose tissue with a #15 scalpel blade.  The skin margins were undermined to an appropriate distance in all directions utilizing iris scissors.
Complex Repair And O-T Advancement Flap Text: The defect edges were debeveled with a #15 scalpel blade.  The primary defect was closed partially with a complex linear closure.  Given the location of the remaining defect, shape of the defect and the proximity to free margins an O-T advancement flap was deemed most appropriate for complete closure of the defect.  Using a sterile surgical marker, an appropriate advancement flap was drawn incorporating the defect and placing the expected incisions within the relaxed skin tension lines where possible.    The area thus outlined was incised deep to adipose tissue with a #15 scalpel blade.  The skin margins were undermined to an appropriate distance in all directions utilizing iris scissors.
Complex Repair And O-L Flap Text: The defect edges were debeveled with a #15 scalpel blade.  The primary defect was closed partially with a complex linear closure.  Given the location of the remaining defect, shape of the defect and the proximity to free margins an O-L flap was deemed most appropriate for complete closure of the defect.  Using a sterile surgical marker, an appropriate flap was drawn incorporating the defect and placing the expected incisions within the relaxed skin tension lines where possible.    The area thus outlined was incised deep to adipose tissue with a #15 scalpel blade.  The skin margins were undermined to an appropriate distance in all directions utilizing iris scissors.
Complex Repair And Bilobe Flap Text: The defect edges were debeveled with a #15 scalpel blade.  The primary defect was closed partially with a complex linear closure.  Given the location of the remaining defect, shape of the defect and the proximity to free margins a bilobe flap was deemed most appropriate for complete closure of the defect.  Using a sterile surgical marker, an appropriate advancement flap was drawn incorporating the defect and placing the expected incisions within the relaxed skin tension lines where possible.    The area thus outlined was incised deep to adipose tissue with a #15 scalpel blade.  The skin margins were undermined to an appropriate distance in all directions utilizing iris scissors.
Complex Repair And Melolabial Flap Text: The defect edges were debeveled with a #15 scalpel blade.  The primary defect was closed partially with a complex linear closure.  Given the location of the remaining defect, shape of the defect and the proximity to free margins a melolabial flap was deemed most appropriate for complete closure of the defect.  Using a sterile surgical marker, an appropriate advancement flap was drawn incorporating the defect and placing the expected incisions within the relaxed skin tension lines where possible.    The area thus outlined was incised deep to adipose tissue with a #15 scalpel blade.  The skin margins were undermined to an appropriate distance in all directions utilizing iris scissors.
Complex Repair And Rotation Flap Text: The defect edges were debeveled with a #15 scalpel blade.  The primary defect was closed partially with a complex linear closure.  Given the location of the remaining defect, shape of the defect and the proximity to free margins a rotation flap was deemed most appropriate for complete closure of the defect.  Using a sterile surgical marker, an appropriate advancement flap was drawn incorporating the defect and placing the expected incisions within the relaxed skin tension lines where possible.    The area thus outlined was incised deep to adipose tissue with a #15 scalpel blade.  The skin margins were undermined to an appropriate distance in all directions utilizing iris scissors.
Complex Repair And Rhombic Flap Text: The defect edges were debeveled with a #15 scalpel blade.  The primary defect was closed partially with a complex linear closure.  Given the location of the remaining defect, shape of the defect and the proximity to free margins a rhombic flap was deemed most appropriate for complete closure of the defect.  Using a sterile surgical marker, an appropriate advancement flap was drawn incorporating the defect and placing the expected incisions within the relaxed skin tension lines where possible.    The area thus outlined was incised deep to adipose tissue with a #15 scalpel blade.  The skin margins were undermined to an appropriate distance in all directions utilizing iris scissors.
Complex Repair And Transposition Flap Text: The defect edges were debeveled with a #15 scalpel blade.  The primary defect was closed partially with a complex linear closure.  Given the location of the remaining defect, shape of the defect and the proximity to free margins a transposition flap was deemed most appropriate for complete closure of the defect.  Using a sterile surgical marker, an appropriate advancement flap was drawn incorporating the defect and placing the expected incisions within the relaxed skin tension lines where possible.    The area thus outlined was incised deep to adipose tissue with a #15 scalpel blade.  The skin margins were undermined to an appropriate distance in all directions utilizing iris scissors.
Complex Repair And V-Y Plasty Text: The defect edges were debeveled with a #15 scalpel blade.  The primary defect was closed partially with a complex linear closure.  Given the location of the remaining defect, shape of the defect and the proximity to free margins a V-Y plasty was deemed most appropriate for complete closure of the defect.  Using a sterile surgical marker, an appropriate advancement flap was drawn incorporating the defect and placing the expected incisions within the relaxed skin tension lines where possible.    The area thus outlined was incised deep to adipose tissue with a #15 scalpel blade.  The skin margins were undermined to an appropriate distance in all directions utilizing iris scissors.
Complex Repair And M Plasty Text: The defect edges were debeveled with a #15 scalpel blade.  The primary defect was closed partially with a complex linear closure.  Given the location of the remaining defect, shape of the defect and the proximity to free margins an M plasty was deemed most appropriate for complete closure of the defect.  Using a sterile surgical marker, an appropriate advancement flap was drawn incorporating the defect and placing the expected incisions within the relaxed skin tension lines where possible.    The area thus outlined was incised deep to adipose tissue with a #15 scalpel blade.  The skin margins were undermined to an appropriate distance in all directions utilizing iris scissors.
Complex Repair And Double M Plasty Text: The defect edges were debeveled with a #15 scalpel blade.  The primary defect was closed partially with a complex linear closure.  Given the location of the remaining defect, shape of the defect and the proximity to free margins a double M plasty was deemed most appropriate for complete closure of the defect.  Using a sterile surgical marker, an appropriate advancement flap was drawn incorporating the defect and placing the expected incisions within the relaxed skin tension lines where possible.    The area thus outlined was incised deep to adipose tissue with a #15 scalpel blade.  The skin margins were undermined to an appropriate distance in all directions utilizing iris scissors.
Complex Repair And W Plasty Text: The defect edges were debeveled with a #15 scalpel blade.  The primary defect was closed partially with a complex linear closure.  Given the location of the remaining defect, shape of the defect and the proximity to free margins a W plasty was deemed most appropriate for complete closure of the defect.  Using a sterile surgical marker, an appropriate advancement flap was drawn incorporating the defect and placing the expected incisions within the relaxed skin tension lines where possible.    The area thus outlined was incised deep to adipose tissue with a #15 scalpel blade.  The skin margins were undermined to an appropriate distance in all directions utilizing iris scissors.
Complex Repair And Z Plasty Text: The defect edges were debeveled with a #15 scalpel blade.  The primary defect was closed partially with a complex linear closure.  Given the location of the remaining defect, shape of the defect and the proximity to free margins a Z plasty was deemed most appropriate for complete closure of the defect.  Using a sterile surgical marker, an appropriate advancement flap was drawn incorporating the defect and placing the expected incisions within the relaxed skin tension lines where possible.    The area thus outlined was incised deep to adipose tissue with a #15 scalpel blade.  The skin margins were undermined to an appropriate distance in all directions utilizing iris scissors.
Complex Repair And Dorsal Nasal Flap Text: The defect edges were debeveled with a #15 scalpel blade.  The primary defect was closed partially with a complex linear closure.  Given the location of the remaining defect, shape of the defect and the proximity to free margins a dorsal nasal flap was deemed most appropriate for complete closure of the defect.  Using a sterile surgical marker, an appropriate flap was drawn incorporating the defect and placing the expected incisions within the relaxed skin tension lines where possible.    The area thus outlined was incised deep to adipose tissue with a #15 scalpel blade.  The skin margins were undermined to an appropriate distance in all directions utilizing iris scissors.
Complex Repair And Ftsg Text: The defect edges were debeveled with a #15 scalpel blade.  The primary defect was closed partially with a complex linear closure.  Given the location of the defect, shape of the defect and the proximity to free margins a full thickness skin graft was deemed most appropriate to repair the remaining defect.  The graft was trimmed to fit the size of the remaining defect.  The graft was then placed in the primary defect, oriented appropriately, and sutured into place.
Complex Repair And Burow's Graft Text: The defect edges were debeveled with a #15 scalpel blade.  The primary defect was closed partially with a complex linear closure.  Given the location of the defect, shape of the defect, the proximity to free margins and the presence of a standing cone deformity a Burow's graft was deemed most appropriate to repair the remaining defect.  The graft was trimmed to fit the size of the remaining defect.  The graft was then placed in the primary defect, oriented appropriately, and sutured into place.
Complex Repair And Split-Thickness Skin Graft Text: The defect edges were debeveled with a #15 scalpel blade.  The primary defect was closed partially with a complex linear closure.  Given the location of the defect, shape of the defect and the proximity to free margins a split thickness skin graft was deemed most appropriate to repair the remaining defect.  The graft was trimmed to fit the size of the remaining defect.  The graft was then placed in the primary defect, oriented appropriately, and sutured into place.
Complex Repair And Epidermal Autograft Text: The defect edges were debeveled with a #15 scalpel blade.  The primary defect was closed partially with a complex linear closure.  Given the location of the defect, shape of the defect and the proximity to free margins an epidermal autograft was deemed most appropriate to repair the remaining defect.  The graft was trimmed to fit the size of the remaining defect.  The graft was then placed in the primary defect, oriented appropriately, and sutured into place.
Complex Repair And Dermal Autograft Text: The defect edges were debeveled with a #15 scalpel blade.  The primary defect was closed partially with a complex linear closure.  Given the location of the defect, shape of the defect and the proximity to free margins an dermal autograft was deemed most appropriate to repair the remaining defect.  The graft was trimmed to fit the size of the remaining defect.  The graft was then placed in the primary defect, oriented appropriately, and sutured into place.
Complex Repair And Tissue Cultured Epidermal Autograft Text: The defect edges were debeveled with a #15 scalpel blade.  The primary defect was closed partially with a complex linear closure.  Given the location of the defect, shape of the defect and the proximity to free margins an tissue cultured epidermal autograft was deemed most appropriate to repair the remaining defect.  The graft was trimmed to fit the size of the remaining defect.  The graft was then placed in the primary defect, oriented appropriately, and sutured into place.
Complex Repair And Xenograft Text: The defect edges were debeveled with a #15 scalpel blade.  The primary defect was closed partially with a complex linear closure.  Given the location of the defect, shape of the defect and the proximity to free margins a xenograft was deemed most appropriate to repair the remaining defect.  The graft was trimmed to fit the size of the remaining defect.  The graft was then placed in the primary defect, oriented appropriately, and sutured into place.
Complex Repair And Skin Substitute Graft Text: The defect edges were debeveled with a #15 scalpel blade.  The primary defect was closed partially with a complex linear closure.  Given the location of the remaining defect, shape of the defect and the proximity to free margins a skin substitute graft was deemed most appropriate to repair the remaining defect.  The graft was trimmed to fit the size of the remaining defect.  The graft was then placed in the primary defect, oriented appropriately, and sutured into place.
Path Notes (To The Dermatopathologist): Please check margins.
Consent was obtained from the patient. The risks and benefits to therapy were discussed in detail. Specifically, the risks of infection, scarring, bleeding, prolonged wound healing, incomplete removal, allergy to anesthesia, nerve injury and recurrence were addressed. Prior to the procedure, the treatment site was clearly identified and confirmed by the patient. All components of Universal Protocol/PAUSE Rule completed.
Post-Care Instructions: I reviewed with the patient in detail post-care instructions:\\n1. Apply bacitracin over the steri-strips.  \\n2. Cut non-stick pad (Telfa) to cover the steri-strips\\n3. Apply tape (hypafix) over the non-stick pad\\n4. Change once per day for 5 days\\n5. Shower with bandage on, change bandage after shower\\n\\nPatient is not to engage in any heavy lifting, exercise, hot tub, or swimming for the next 14 days. Should the patient develop any fevers, chills, bleeding, severe pain patient will contact the office immediately.
Home Suture Removal Text: Patient was provided a home suture removal kit and will remove their sutures at home.  If they have any questions or difficulties they will call the office.
Where Do You Want The Question To Include Opioid Counseling Located?: Case Summary Tab
Billing Type: Third-Party Bill
Information: Selecting Yes will display possible errors in your note based on the variables you have selected. This validation is only offered as a suggestion for you. PLEASE NOTE THAT THE VALIDATION TEXT WILL BE REMOVED WHEN YOU FINALIZE YOUR NOTE. IF YOU WANT TO FAX A PRELIMINARY NOTE YOU WILL NEED TO TOGGLE THIS TO 'NO' IF YOU DO NOT WANT IT IN YOUR FAXED NOTE.

## 2024-01-05 ENCOUNTER — HOSPITAL ENCOUNTER (OUTPATIENT)
Dept: LAB | Facility: MEDICAL CENTER | Age: 44
End: 2024-01-05
Attending: NURSE PRACTITIONER
Payer: COMMERCIAL

## 2024-01-05 LAB — PROLACTIN SERPL-MCNC: 109 NG/ML (ref 2.1–17.7)

## 2024-01-05 PROCEDURE — 36415 COLL VENOUS BLD VENIPUNCTURE: CPT

## 2024-01-05 PROCEDURE — 84146 ASSAY OF PROLACTIN: CPT

## 2024-01-23 ENCOUNTER — HOSPITAL ENCOUNTER (OUTPATIENT)
Dept: LAB | Facility: MEDICAL CENTER | Age: 44
End: 2024-01-23
Attending: NURSE PRACTITIONER
Payer: COMMERCIAL

## 2024-01-23 LAB
ESTRADIOL SERPL-MCNC: 47.6 PG/ML
HCT VFR BLD AUTO: 47.1 % (ref 42–52)
HGB BLD-MCNC: 16.5 G/DL (ref 14–18)
PROLACTIN SERPL-MCNC: 111 NG/ML (ref 2.1–17.7)
TESTOST SERPL-MCNC: 1183 NG/DL (ref 175–781)

## 2024-01-23 PROCEDURE — 85018 HEMOGLOBIN: CPT

## 2024-01-23 PROCEDURE — 84403 ASSAY OF TOTAL TESTOSTERONE: CPT

## 2024-01-23 PROCEDURE — 36415 COLL VENOUS BLD VENIPUNCTURE: CPT

## 2024-01-23 PROCEDURE — 82670 ASSAY OF TOTAL ESTRADIOL: CPT

## 2024-01-23 PROCEDURE — 84146 ASSAY OF PROLACTIN: CPT

## 2024-01-23 PROCEDURE — 85014 HEMATOCRIT: CPT

## 2024-01-23 PROCEDURE — 83036 HEMOGLOBIN GLYCOSYLATED A1C: CPT

## 2024-01-25 LAB
EST. AVERAGE GLUCOSE BLD GHB EST-MCNC: NORMAL MG/DL
HBA1C MFR BLD: NORMAL % (ref 4–5.6)

## 2024-02-09 ENCOUNTER — APPOINTMENT (OUTPATIENT)
Dept: RADIOLOGY | Facility: MEDICAL CENTER | Age: 44
End: 2024-02-09
Attending: NURSE PRACTITIONER
Payer: COMMERCIAL

## 2024-02-09 DIAGNOSIS — E27.40: ICD-10-CM

## 2024-02-09 DIAGNOSIS — E23.0 HYPOPITUITARISM DUE TO PITUITARY TUMOR (HCC): ICD-10-CM

## 2024-02-09 DIAGNOSIS — D49.7 HYPOPITUITARISM DUE TO PITUITARY TUMOR (HCC): ICD-10-CM

## 2024-02-09 DIAGNOSIS — D35.2 PITUITARY ADENOMA (HCC): ICD-10-CM

## 2024-02-09 PROCEDURE — 70551 MRI BRAIN STEM W/O DYE: CPT

## 2024-03-13 ENCOUNTER — HOSPITAL ENCOUNTER (OUTPATIENT)
Dept: LAB | Facility: MEDICAL CENTER | Age: 44
End: 2024-03-13
Attending: INTERNAL MEDICINE
Payer: COMMERCIAL

## 2024-03-13 LAB
ERYTHROCYTE [DISTWIDTH] IN BLOOD BY AUTOMATED COUNT: 40.8 FL (ref 35.9–50)
HCT VFR BLD AUTO: 48.2 % (ref 42–52)
HGB BLD-MCNC: 16.8 G/DL (ref 14–18)
MCH RBC QN AUTO: 31.8 PG (ref 27–33)
MCHC RBC AUTO-ENTMCNC: 34.9 G/DL (ref 32.3–36.5)
MCV RBC AUTO: 91.1 FL (ref 81.4–97.8)
PLATELET # BLD AUTO: 180 K/UL (ref 164–446)
PMV BLD AUTO: 11.5 FL (ref 9–12.9)
RBC # BLD AUTO: 5.29 M/UL (ref 4.7–6.1)
WBC # BLD AUTO: 6.9 K/UL (ref 4.8–10.8)

## 2024-03-13 PROCEDURE — 83002 ASSAY OF GONADOTROPIN (LH): CPT

## 2024-03-13 PROCEDURE — 84403 ASSAY OF TOTAL TESTOSTERONE: CPT

## 2024-03-13 PROCEDURE — 36415 COLL VENOUS BLD VENIPUNCTURE: CPT

## 2024-03-13 PROCEDURE — 84439 ASSAY OF FREE THYROXINE: CPT

## 2024-03-13 PROCEDURE — 85027 COMPLETE CBC AUTOMATED: CPT

## 2024-03-13 PROCEDURE — 84270 ASSAY OF SEX HORMONE GLOBUL: CPT

## 2024-03-13 PROCEDURE — 84481 FREE ASSAY (FT-3): CPT

## 2024-03-13 PROCEDURE — 82306 VITAMIN D 25 HYDROXY: CPT

## 2024-03-13 PROCEDURE — 82607 VITAMIN B-12: CPT

## 2024-03-13 PROCEDURE — 84443 ASSAY THYROID STIM HORMONE: CPT

## 2024-03-13 PROCEDURE — 83001 ASSAY OF GONADOTROPIN (FSH): CPT

## 2024-03-13 PROCEDURE — 84146 ASSAY OF PROLACTIN: CPT

## 2024-03-14 LAB
25(OH)D3 SERPL-MCNC: 22 NG/ML (ref 30–100)
FSH SERPL-ACNC: <0.3 MIU/ML (ref 1.5–12.4)
LH SERPL-ACNC: <0.3 IU/L (ref 1.7–8.6)
PROLACTIN SERPL-MCNC: 94.1 NG/ML (ref 2.1–17.7)
T3FREE SERPL-MCNC: 1.83 PG/ML (ref 2–4.4)
T4 FREE SERPL-MCNC: 1.35 NG/DL (ref 0.93–1.7)
TESTOST SERPL-MCNC: 761 NG/DL (ref 175–781)
TSH SERPL DL<=0.005 MIU/L-ACNC: 0.16 UIU/ML (ref 0.38–5.33)
VIT B12 SERPL-MCNC: 328 PG/ML (ref 211–911)

## 2024-03-15 LAB — SHBG SERPL-SCNC: 26 NMOL/L (ref 17–56)

## 2024-08-22 ENCOUNTER — HOSPITAL ENCOUNTER (OUTPATIENT)
Dept: LAB | Facility: MEDICAL CENTER | Age: 44
End: 2024-08-22
Attending: INTERNAL MEDICINE
Payer: COMMERCIAL

## 2024-08-22 LAB
25(OH)D3 SERPL-MCNC: 38 NG/ML (ref 30–100)
CORTIS SERPL-MCNC: 9.2 UG/DL (ref 0–23)
ERYTHROCYTE [DISTWIDTH] IN BLOOD BY AUTOMATED COUNT: 44.7 FL (ref 35.9–50)
HCT VFR BLD AUTO: 48.9 % (ref 42–52)
HGB BLD-MCNC: 16.8 G/DL (ref 14–18)
MCH RBC QN AUTO: 32.1 PG (ref 27–33)
MCHC RBC AUTO-ENTMCNC: 34.4 G/DL (ref 32.3–36.5)
MCV RBC AUTO: 93.3 FL (ref 81.4–97.8)
PLATELET # BLD AUTO: 163 K/UL (ref 164–446)
PMV BLD AUTO: 10.2 FL (ref 9–12.9)
PROLACTIN SERPL-MCNC: 114 NG/ML (ref 2.1–17.7)
RBC # BLD AUTO: 5.24 M/UL (ref 4.7–6.1)
T3FREE SERPL-MCNC: 2.62 PG/ML (ref 2–4.4)
T4 FREE SERPL-MCNC: 1.22 NG/DL (ref 0.93–1.7)
TESTOST SERPL-MCNC: 992 NG/DL (ref 175–781)
TSH SERPL-ACNC: 0.73 UIU/ML (ref 0.35–5.5)
VIT B12 SERPL-MCNC: 517 PG/ML (ref 211–911)
WBC # BLD AUTO: 4.4 K/UL (ref 4.8–10.8)

## 2024-08-22 PROCEDURE — 82024 ASSAY OF ACTH: CPT

## 2024-08-22 PROCEDURE — 84146 ASSAY OF PROLACTIN: CPT

## 2024-08-22 PROCEDURE — 85027 COMPLETE CBC AUTOMATED: CPT

## 2024-08-22 PROCEDURE — 82533 TOTAL CORTISOL: CPT

## 2024-08-22 PROCEDURE — 36415 COLL VENOUS BLD VENIPUNCTURE: CPT

## 2024-08-22 PROCEDURE — 82607 VITAMIN B-12: CPT

## 2024-08-22 PROCEDURE — 84403 ASSAY OF TOTAL TESTOSTERONE: CPT

## 2024-08-22 PROCEDURE — 84270 ASSAY OF SEX HORMONE GLOBUL: CPT

## 2024-08-22 PROCEDURE — 84481 FREE ASSAY (FT-3): CPT

## 2024-08-22 PROCEDURE — 84443 ASSAY THYROID STIM HORMONE: CPT

## 2024-08-22 PROCEDURE — 82306 VITAMIN D 25 HYDROXY: CPT

## 2024-08-22 PROCEDURE — 84439 ASSAY OF FREE THYROXINE: CPT

## 2024-08-23 LAB — ACTH PLAS-MCNC: 31 PG/ML (ref 7.2–63.3)

## 2024-08-24 LAB — SHBG SERPL-SCNC: 22 NMOL/L (ref 17–56)

## 2024-09-13 ENCOUNTER — HOSPITAL ENCOUNTER (OUTPATIENT)
Dept: LAB | Facility: MEDICAL CENTER | Age: 44
End: 2024-09-13
Attending: FAMILY MEDICINE
Payer: COMMERCIAL

## 2024-09-13 LAB
25(OH)D3 SERPL-MCNC: 31 NG/ML (ref 30–100)
ALBUMIN SERPL BCP-MCNC: 4.4 G/DL (ref 3.2–4.9)
ALBUMIN/GLOB SERPL: 2 G/DL
ALP SERPL-CCNC: 60 U/L (ref 30–99)
ALT SERPL-CCNC: 16 U/L (ref 2–50)
ANION GAP SERPL CALC-SCNC: 8 MMOL/L (ref 7–16)
AST SERPL-CCNC: 19 U/L (ref 12–45)
BASOPHILS # BLD AUTO: 0.9 % (ref 0–1.8)
BASOPHILS # BLD: 0.04 K/UL (ref 0–0.12)
BILIRUB SERPL-MCNC: 1.7 MG/DL (ref 0.1–1.5)
BUN SERPL-MCNC: 16 MG/DL (ref 8–22)
CALCIUM ALBUM COR SERPL-MCNC: 8.8 MG/DL (ref 8.5–10.5)
CALCIUM SERPL-MCNC: 9.1 MG/DL (ref 8.4–10.2)
CHLORIDE SERPL-SCNC: 105 MMOL/L (ref 96–112)
CO2 SERPL-SCNC: 27 MMOL/L (ref 20–33)
CREAT SERPL-MCNC: 1.19 MG/DL (ref 0.5–1.4)
EOSINOPHIL # BLD AUTO: 0.07 K/UL (ref 0–0.51)
EOSINOPHIL NFR BLD: 1.5 % (ref 0–6.9)
ERYTHROCYTE [DISTWIDTH] IN BLOOD BY AUTOMATED COUNT: 42.3 FL (ref 35.9–50)
ESTRADIOL SERPL-MCNC: 15.2 PG/ML
FSH SERPL-ACNC: <0.3 MIU/ML (ref 1.5–12.4)
GFR SERPLBLD CREATININE-BSD FMLA CKD-EPI: 77 ML/MIN/1.73 M 2
GLOBULIN SER CALC-MCNC: 2.2 G/DL (ref 1.9–3.5)
GLUCOSE SERPL-MCNC: 72 MG/DL (ref 65–99)
HCT VFR BLD AUTO: 47.9 % (ref 42–52)
HGB BLD-MCNC: 16.7 G/DL (ref 14–18)
IMM GRANULOCYTES # BLD AUTO: 0.01 K/UL (ref 0–0.11)
IMM GRANULOCYTES NFR BLD AUTO: 0.2 % (ref 0–0.9)
LH SERPL-ACNC: <0.3 IU/L (ref 1.7–8.6)
LYMPHOCYTES # BLD AUTO: 1.43 K/UL (ref 1–4.8)
LYMPHOCYTES NFR BLD: 30.8 % (ref 22–41)
MCH RBC QN AUTO: 32.5 PG (ref 27–33)
MCHC RBC AUTO-ENTMCNC: 34.9 G/DL (ref 32.3–36.5)
MCV RBC AUTO: 93.2 FL (ref 81.4–97.8)
MONOCYTES # BLD AUTO: 0.31 K/UL (ref 0–0.85)
MONOCYTES NFR BLD AUTO: 6.7 % (ref 0–13.4)
NEUTROPHILS # BLD AUTO: 2.79 K/UL (ref 1.82–7.42)
NEUTROPHILS NFR BLD: 59.9 % (ref 44–72)
NRBC # BLD AUTO: 0 K/UL
NRBC BLD-RTO: 0 /100 WBC (ref 0–0.2)
PLATELET # BLD AUTO: 157 K/UL (ref 164–446)
PMV BLD AUTO: 10 FL (ref 9–12.9)
POTASSIUM SERPL-SCNC: 3.9 MMOL/L (ref 3.6–5.5)
PROLACTIN SERPL-MCNC: 100 NG/ML (ref 2.1–17.7)
PROT SERPL-MCNC: 6.6 G/DL (ref 6–8.2)
RBC # BLD AUTO: 5.14 M/UL (ref 4.7–6.1)
SODIUM SERPL-SCNC: 140 MMOL/L (ref 135–145)
TSH SERPL DL<=0.005 MIU/L-ACNC: 0.55 UIU/ML (ref 0.38–5.33)
WBC # BLD AUTO: 4.7 K/UL (ref 4.8–10.8)

## 2024-09-13 PROCEDURE — 84270 ASSAY OF SEX HORMONE GLOBUL: CPT

## 2024-09-13 PROCEDURE — 84443 ASSAY THYROID STIM HORMONE: CPT

## 2024-09-13 PROCEDURE — 85025 COMPLETE CBC W/AUTO DIFF WBC: CPT

## 2024-09-13 PROCEDURE — 84146 ASSAY OF PROLACTIN: CPT

## 2024-09-13 PROCEDURE — 83001 ASSAY OF GONADOTROPIN (FSH): CPT

## 2024-09-13 PROCEDURE — 36415 COLL VENOUS BLD VENIPUNCTURE: CPT

## 2024-09-13 PROCEDURE — 80053 COMPREHEN METABOLIC PANEL: CPT

## 2024-09-13 PROCEDURE — 82306 VITAMIN D 25 HYDROXY: CPT

## 2024-09-13 PROCEDURE — 82670 ASSAY OF TOTAL ESTRADIOL: CPT

## 2024-09-13 PROCEDURE — 84402 ASSAY OF FREE TESTOSTERONE: CPT

## 2024-09-13 PROCEDURE — 84403 ASSAY OF TOTAL TESTOSTERONE: CPT

## 2024-09-13 PROCEDURE — 83002 ASSAY OF GONADOTROPIN (LH): CPT

## 2024-09-14 LAB
SHBG SERPL-SCNC: 21 NMOL/L (ref 17–56)
TESTOST FREE MFR SERPL: 2.1 % (ref 1.6–2.9)
TESTOST FREE SERPL-MCNC: 25 PG/ML (ref 47–244)
TESTOST SERPL-MCNC: 117 NG/DL (ref 300–890)

## 2024-10-18 ENCOUNTER — HOSPITAL ENCOUNTER (OUTPATIENT)
Dept: LAB | Facility: MEDICAL CENTER | Age: 44
End: 2024-10-18
Attending: FAMILY MEDICINE
Payer: COMMERCIAL

## 2024-10-18 LAB
D DIMER PPP IA.FEU-MCNC: 0.29 UG/ML (FEU) (ref 0–0.5)
ERYTHROCYTE [SEDIMENTATION RATE] IN BLOOD BY WESTERGREN METHOD: 2 MM/HOUR (ref 0–20)

## 2024-10-18 PROCEDURE — 86140 C-REACTIVE PROTEIN: CPT

## 2024-10-18 PROCEDURE — 85652 RBC SED RATE AUTOMATED: CPT

## 2024-10-18 PROCEDURE — 36415 COLL VENOUS BLD VENIPUNCTURE: CPT

## 2024-10-18 PROCEDURE — 85379 FIBRIN DEGRADATION QUANT: CPT

## 2024-10-18 PROCEDURE — 83880 ASSAY OF NATRIURETIC PEPTIDE: CPT

## 2024-10-19 LAB
CRP SERPL HS-MCNC: <0.3 MG/DL (ref 0–0.75)
NT-PROBNP SERPL IA-MCNC: <36 PG/ML (ref 0–125)

## 2024-11-15 ENCOUNTER — HOSPITAL ENCOUNTER (OUTPATIENT)
Dept: CARDIOLOGY | Facility: MEDICAL CENTER | Age: 44
End: 2024-11-15
Attending: FAMILY MEDICINE
Payer: COMMERCIAL

## 2024-11-15 DIAGNOSIS — R60.9 IDIOPATHIC EDEMA: ICD-10-CM

## 2024-11-15 LAB
LV EJECT FRACT  99904: 55
LV EJECT FRACT MOD 2C 99903: 56.74
LV EJECT FRACT MOD 4C 99902: 52.28
LV EJECT FRACT MOD BP 99901: 55.66

## 2024-11-15 PROCEDURE — 93306 TTE W/DOPPLER COMPLETE: CPT

## 2024-11-15 PROCEDURE — 93306 TTE W/DOPPLER COMPLETE: CPT | Mod: 26 | Performed by: INTERNAL MEDICINE

## 2024-12-07 ENCOUNTER — HOSPITAL ENCOUNTER (OUTPATIENT)
Dept: LAB | Facility: MEDICAL CENTER | Age: 44
End: 2024-12-07
Attending: OPHTHALMOLOGY
Payer: COMMERCIAL

## 2024-12-07 LAB
CHOLEST SERPL-MCNC: 233 MG/DL (ref 100–199)
HDLC SERPL-MCNC: 43 MG/DL
LDLC SERPL CALC-MCNC: 158 MG/DL
PROLACTIN SERPL-MCNC: 111 NG/ML (ref 2.1–17.7)
PSA SERPL DL<=0.01 NG/ML-MCNC: 0.89 NG/ML (ref 0–4)
TRIGL SERPL-MCNC: 160 MG/DL (ref 0–149)

## 2024-12-07 PROCEDURE — 36415 COLL VENOUS BLD VENIPUNCTURE: CPT

## 2024-12-07 PROCEDURE — 84146 ASSAY OF PROLACTIN: CPT

## 2024-12-07 PROCEDURE — 84153 ASSAY OF PSA TOTAL: CPT

## 2024-12-07 PROCEDURE — 80061 LIPID PANEL: CPT

## 2025-02-20 ENCOUNTER — HOSPITAL ENCOUNTER (OUTPATIENT)
Dept: LAB | Facility: MEDICAL CENTER | Age: 45
End: 2025-02-20
Attending: STUDENT IN AN ORGANIZED HEALTH CARE EDUCATION/TRAINING PROGRAM
Payer: COMMERCIAL

## 2025-02-20 LAB
ALBUMIN SERPL BCP-MCNC: 4.1 G/DL (ref 3.2–4.9)
ALBUMIN/GLOB SERPL: 1.6 G/DL
ALP SERPL-CCNC: 56 U/L (ref 30–99)
ALT SERPL-CCNC: 15 U/L (ref 2–50)
ANION GAP SERPL CALC-SCNC: 9 MMOL/L (ref 7–16)
AST SERPL-CCNC: 20 U/L (ref 12–45)
BILIRUB SERPL-MCNC: 1.4 MG/DL (ref 0.1–1.5)
BUN SERPL-MCNC: 15 MG/DL (ref 8–22)
CALCIUM ALBUM COR SERPL-MCNC: 9.1 MG/DL (ref 8.5–10.5)
CALCIUM SERPL-MCNC: 9.2 MG/DL (ref 8.5–10.5)
CHLORIDE SERPL-SCNC: 106 MMOL/L (ref 96–112)
CO2 SERPL-SCNC: 26 MMOL/L (ref 20–33)
CORTIS SERPL-MCNC: 13.6 UG/DL (ref 0–23)
CREAT SERPL-MCNC: 1.31 MG/DL (ref 0.5–1.4)
ERYTHROCYTE [DISTWIDTH] IN BLOOD BY AUTOMATED COUNT: 39.9 FL (ref 35.9–50)
ESTRADIOL SERPL-MCNC: 54.7 PG/ML
GFR SERPLBLD CREATININE-BSD FMLA CKD-EPI: 69 ML/MIN/1.73 M 2
GLOBULIN SER CALC-MCNC: 2.5 G/DL (ref 1.9–3.5)
GLUCOSE SERPL-MCNC: 84 MG/DL (ref 65–99)
HCT VFR BLD AUTO: 47.3 % (ref 42–52)
HGB BLD-MCNC: 16.6 G/DL (ref 14–18)
MCH RBC QN AUTO: 31.4 PG (ref 27–33)
MCHC RBC AUTO-ENTMCNC: 35.1 G/DL (ref 32.3–36.5)
MCV RBC AUTO: 89.4 FL (ref 81.4–97.8)
PLATELET # BLD AUTO: 161 K/UL (ref 164–446)
PMV BLD AUTO: 10.3 FL (ref 9–12.9)
POTASSIUM SERPL-SCNC: 4 MMOL/L (ref 3.6–5.5)
PROLACTIN SERPL-MCNC: 125 NG/ML (ref 2.1–17.7)
PROT SERPL-MCNC: 6.6 G/DL (ref 6–8.2)
RBC # BLD AUTO: 5.29 M/UL (ref 4.7–6.1)
SODIUM SERPL-SCNC: 141 MMOL/L (ref 135–145)
T4 FREE SERPL-MCNC: 1.11 NG/DL (ref 0.93–1.7)
TESTOST SERPL-MCNC: 893 NG/DL (ref 175–781)
WBC # BLD AUTO: 5.3 K/UL (ref 4.8–10.8)

## 2025-02-20 PROCEDURE — 82670 ASSAY OF TOTAL ESTRADIOL: CPT

## 2025-02-20 PROCEDURE — 84146 ASSAY OF PROLACTIN: CPT

## 2025-02-20 PROCEDURE — 82533 TOTAL CORTISOL: CPT

## 2025-02-20 PROCEDURE — 85027 COMPLETE CBC AUTOMATED: CPT

## 2025-02-20 PROCEDURE — 84403 ASSAY OF TOTAL TESTOSTERONE: CPT

## 2025-02-20 PROCEDURE — 36415 COLL VENOUS BLD VENIPUNCTURE: CPT

## 2025-02-20 PROCEDURE — 84439 ASSAY OF FREE THYROXINE: CPT

## 2025-02-20 PROCEDURE — 80053 COMPREHEN METABOLIC PANEL: CPT

## 2025-03-28 ENCOUNTER — HOSPITAL ENCOUNTER (OUTPATIENT)
Dept: RADIOLOGY | Facility: MEDICAL CENTER | Age: 45
End: 2025-03-28
Attending: STUDENT IN AN ORGANIZED HEALTH CARE EDUCATION/TRAINING PROGRAM
Payer: COMMERCIAL

## 2025-03-28 DIAGNOSIS — E23.0 HYPOPITUITARISM (HCC): ICD-10-CM

## 2025-03-28 DIAGNOSIS — D35.2 PROLACTINOMA (HCC): ICD-10-CM

## 2025-03-28 PROCEDURE — 70551 MRI BRAIN STEM W/O DYE: CPT

## 2025-05-16 ENCOUNTER — HOSPITAL ENCOUNTER (OUTPATIENT)
Dept: RADIOLOGY | Facility: MEDICAL CENTER | Age: 45
End: 2025-05-16
Attending: OPHTHALMOLOGY
Payer: COMMERCIAL

## 2025-05-16 DIAGNOSIS — E78.49 FAMILIAL MULTIPLE LIPOPROTEIN-TYPE HYPERLIPIDEMIA: ICD-10-CM

## 2025-05-16 PROCEDURE — 4410556 CT-CARDIAC SCORING (SELF PAY ONLY)

## 2025-06-06 ENCOUNTER — HOSPITAL ENCOUNTER (OUTPATIENT)
Dept: LAB | Facility: MEDICAL CENTER | Age: 45
End: 2025-06-06
Attending: INTERNAL MEDICINE
Payer: COMMERCIAL

## 2025-06-06 LAB
25(OH)D3 SERPL-MCNC: 40 NG/ML (ref 30–100)
CORTIS SERPL-MCNC: 9.8 UG/DL (ref 0–23)
ERYTHROCYTE [DISTWIDTH] IN BLOOD BY AUTOMATED COUNT: 41.6 FL (ref 35.9–50)
HCT VFR BLD AUTO: 46.9 % (ref 42–52)
HGB BLD-MCNC: 16.4 G/DL (ref 14–18)
MCH RBC QN AUTO: 31.5 PG (ref 27–33)
MCHC RBC AUTO-ENTMCNC: 35 G/DL (ref 32.3–36.5)
MCV RBC AUTO: 90.2 FL (ref 81.4–97.8)
PLATELET # BLD AUTO: 156 K/UL (ref 164–446)
PMV BLD AUTO: 10.6 FL (ref 9–12.9)
PROLACTIN SERPL-MCNC: 96.1 NG/ML (ref 2.1–17.7)
RBC # BLD AUTO: 5.2 M/UL (ref 4.7–6.1)
T3FREE SERPL-MCNC: 2.6 PG/ML (ref 2–4.4)
T4 FREE SERPL-MCNC: 1.22 NG/DL (ref 0.93–1.7)
TESTOST SERPL-MCNC: 463 NG/DL (ref 175–781)
TSH SERPL-ACNC: 0.45 UIU/ML (ref 0.38–5.33)
VIT B12 SERPL-MCNC: 490 PG/ML (ref 211–911)
WBC # BLD AUTO: 3.4 K/UL (ref 4.8–10.8)

## 2025-06-06 PROCEDURE — 84270 ASSAY OF SEX HORMONE GLOBUL: CPT

## 2025-06-06 PROCEDURE — 84481 FREE ASSAY (FT-3): CPT

## 2025-06-06 PROCEDURE — 85027 COMPLETE CBC AUTOMATED: CPT

## 2025-06-06 PROCEDURE — 84403 ASSAY OF TOTAL TESTOSTERONE: CPT

## 2025-06-06 PROCEDURE — 82607 VITAMIN B-12: CPT

## 2025-06-06 PROCEDURE — 82306 VITAMIN D 25 HYDROXY: CPT

## 2025-06-06 PROCEDURE — 84439 ASSAY OF FREE THYROXINE: CPT

## 2025-06-06 PROCEDURE — 84146 ASSAY OF PROLACTIN: CPT

## 2025-06-06 PROCEDURE — 82533 TOTAL CORTISOL: CPT

## 2025-06-06 PROCEDURE — 36415 COLL VENOUS BLD VENIPUNCTURE: CPT

## 2025-06-06 PROCEDURE — 84443 ASSAY THYROID STIM HORMONE: CPT

## 2025-06-06 PROCEDURE — 82024 ASSAY OF ACTH: CPT

## 2025-06-09 LAB
ACTH PLAS-MCNC: 19 PG/ML (ref 7.2–63.3)
SHBG SERPL-SCNC: 22 NMOL/L (ref 17–56)

## 2025-06-10 ENCOUNTER — HOSPITAL ENCOUNTER (OUTPATIENT)
Dept: LAB | Facility: MEDICAL CENTER | Age: 45
End: 2025-06-10
Attending: OPTOMETRIST
Payer: COMMERCIAL

## 2025-06-10 LAB
ANION GAP SERPL CALC-SCNC: 13 MMOL/L (ref 7–16)
BASOPHILS # BLD AUTO: 0.9 % (ref 0–1.8)
BASOPHILS # BLD: 0.04 K/UL (ref 0–0.12)
BUN SERPL-MCNC: 15 MG/DL (ref 8–22)
CALCIUM SERPL-MCNC: 9.5 MG/DL (ref 8.5–10.5)
CHLORIDE SERPL-SCNC: 104 MMOL/L (ref 96–112)
CO2 SERPL-SCNC: 24 MMOL/L (ref 20–33)
CREAT SERPL-MCNC: 1.18 MG/DL (ref 0.5–1.4)
CRP SERPL HS-MCNC: <0.3 MG/DL (ref 0–0.75)
EOSINOPHIL # BLD AUTO: 0.03 K/UL (ref 0–0.51)
EOSINOPHIL NFR BLD: 0.6 % (ref 0–6.9)
ERYTHROCYTE [DISTWIDTH] IN BLOOD BY AUTOMATED COUNT: 41.1 FL (ref 35.9–50)
ERYTHROCYTE [SEDIMENTATION RATE] IN BLOOD BY WESTERGREN METHOD: 4 MM/HOUR (ref 0–20)
GFR SERPLBLD CREATININE-BSD FMLA CKD-EPI: 78 ML/MIN/1.73 M 2
GLUCOSE SERPL-MCNC: 101 MG/DL (ref 65–99)
HCT VFR BLD AUTO: 45.9 % (ref 42–52)
HGB BLD-MCNC: 16.3 G/DL (ref 14–18)
IMM GRANULOCYTES # BLD AUTO: 0 K/UL (ref 0–0.11)
IMM GRANULOCYTES NFR BLD AUTO: 0 % (ref 0–0.9)
LYMPHOCYTES # BLD AUTO: 1.11 K/UL (ref 1–4.8)
LYMPHOCYTES NFR BLD: 23.8 % (ref 22–41)
MCH RBC QN AUTO: 31.3 PG (ref 27–33)
MCHC RBC AUTO-ENTMCNC: 35.5 G/DL (ref 32.3–36.5)
MCV RBC AUTO: 88.3 FL (ref 81.4–97.8)
MONOCYTES # BLD AUTO: 0.21 K/UL (ref 0–0.85)
MONOCYTES NFR BLD AUTO: 4.5 % (ref 0–13.4)
NEUTROPHILS # BLD AUTO: 3.28 K/UL (ref 1.82–7.42)
NEUTROPHILS NFR BLD: 70.2 % (ref 44–72)
NRBC # BLD AUTO: 0 K/UL
NRBC BLD-RTO: 0 /100 WBC (ref 0–0.2)
PLATELET # BLD AUTO: 173 K/UL (ref 164–446)
PMV BLD AUTO: 10.8 FL (ref 9–12.9)
POTASSIUM SERPL-SCNC: 4.3 MMOL/L (ref 3.6–5.5)
RBC # BLD AUTO: 5.2 M/UL (ref 4.7–6.1)
SODIUM SERPL-SCNC: 141 MMOL/L (ref 135–145)
WBC # BLD AUTO: 4.7 K/UL (ref 4.8–10.8)

## 2025-06-10 PROCEDURE — 36415 COLL VENOUS BLD VENIPUNCTURE: CPT

## 2025-06-10 PROCEDURE — 80048 BASIC METABOLIC PNL TOTAL CA: CPT

## 2025-06-10 PROCEDURE — 82746 ASSAY OF FOLIC ACID SERUM: CPT

## 2025-06-10 PROCEDURE — 86140 C-REACTIVE PROTEIN: CPT

## 2025-06-10 PROCEDURE — 85025 COMPLETE CBC W/AUTO DIFF WBC: CPT

## 2025-06-10 PROCEDURE — 85652 RBC SED RATE AUTOMATED: CPT

## 2025-06-11 LAB — FOLATE SERPL-MCNC: 13.1 NG/ML

## (undated) DEVICE — SOD. CHL 10CC SYRINGE PREFILL - W/10 CC (30/BX)

## (undated) DEVICE — CON SEDATION EA ADDL 15 MIN

## (undated) DEVICE — CANNULA W/ SUPPLY TUBING O2 - (50/CA)

## (undated) DEVICE — SYRINGE 6 CC 20 GA X 1 1/2 - NDL SAFETY  (50/BX)

## (undated) DEVICE — CON SEDATION/>5 YR 1ST 15 MIN

## (undated) DEVICE — SYRINGE 3 CC 22 GA X 1-1/2 - NDL SAFETY (50/BX 8BX/CA)

## (undated) DEVICE — ELECTRODE 850 FOAM ADHESIVE - HYDROGEL RADIOTRNSPRNT (50/PK)